# Patient Record
Sex: MALE | Race: OTHER | HISPANIC OR LATINO | Employment: OTHER | URBAN - METROPOLITAN AREA
[De-identification: names, ages, dates, MRNs, and addresses within clinical notes are randomized per-mention and may not be internally consistent; named-entity substitution may affect disease eponyms.]

---

## 2018-08-01 ENCOUNTER — HOSPITAL ENCOUNTER (INPATIENT)
Facility: HOSPITAL | Age: 83
LOS: 6 days | Discharge: HOSPICE | DRG: 698 | End: 2018-08-07
Attending: EMERGENCY MEDICINE | Admitting: INTERNAL MEDICINE
Payer: COMMERCIAL

## 2018-08-01 ENCOUNTER — APPOINTMENT (INPATIENT)
Dept: RADIOLOGY | Facility: HOSPITAL | Age: 83
DRG: 698 | End: 2018-08-01
Attending: EMERGENCY MEDICINE
Payer: COMMERCIAL

## 2018-08-01 ENCOUNTER — APPOINTMENT (EMERGENCY)
Dept: RADIOLOGY | Facility: HOSPITAL | Age: 83
DRG: 698 | End: 2018-08-01
Payer: COMMERCIAL

## 2018-08-01 ENCOUNTER — APPOINTMENT (EMERGENCY)
Dept: CT IMAGING | Facility: HOSPITAL | Age: 83
DRG: 698 | End: 2018-08-01
Payer: COMMERCIAL

## 2018-08-01 DIAGNOSIS — R65.21 SEPTIC SHOCK (HCC): ICD-10-CM

## 2018-08-01 DIAGNOSIS — N39.0 UTI (URINARY TRACT INFECTION): Primary | ICD-10-CM

## 2018-08-01 DIAGNOSIS — A41.9 SEPTIC SHOCK (HCC): ICD-10-CM

## 2018-08-01 DIAGNOSIS — N13.30 HYDROURETERONEPHROSIS: ICD-10-CM

## 2018-08-01 DIAGNOSIS — K92.2 GI BLEED: ICD-10-CM

## 2018-08-01 PROBLEM — I10 HYPERTENSION: Status: ACTIVE | Noted: 2018-08-01

## 2018-08-01 PROBLEM — D62 ACUTE BLOOD LOSS ANEMIA: Status: ACTIVE | Noted: 2018-08-01

## 2018-08-01 PROBLEM — K21.9 GERD (GASTROESOPHAGEAL REFLUX DISEASE): Status: ACTIVE | Noted: 2018-08-01

## 2018-08-01 PROBLEM — F03.90 DEMENTIA (HCC): Status: ACTIVE | Noted: 2018-08-01

## 2018-08-01 PROBLEM — N17.9 ACUTE KIDNEY INJURY (NONTRAUMATIC) (HCC): Status: ACTIVE | Noted: 2018-08-01

## 2018-08-01 LAB
ABO GROUP BLD: NORMAL
ALBUMIN SERPL BCP-MCNC: 2 G/DL (ref 3.5–5)
ALP SERPL-CCNC: 78 U/L (ref 46–116)
ALT SERPL W P-5'-P-CCNC: 20 U/L (ref 12–78)
ANION GAP SERPL CALCULATED.3IONS-SCNC: 10 MMOL/L (ref 4–13)
APTT PPP: 48 SECONDS (ref 24–36)
AST SERPL W P-5'-P-CCNC: 33 U/L (ref 5–45)
ATRIAL RATE: 87 BPM
BACTERIA UR QL AUTO: ABNORMAL /HPF
BASOPHILS # BLD MANUAL: 0 THOUSAND/UL (ref 0–0.1)
BASOPHILS NFR MAR MANUAL: 0 % (ref 0–1)
BILIRUB SERPL-MCNC: 0.4 MG/DL (ref 0.2–1)
BILIRUB UR QL STRIP: NEGATIVE
BLASTS NFR BLD MANUAL: 2 %
BLD GP AB SCN SERPL QL: NEGATIVE
BUN SERPL-MCNC: 35 MG/DL (ref 5–25)
BURR CELLS BLD QL SMEAR: PRESENT
CALCIUM SERPL-MCNC: 8 MG/DL (ref 8.3–10.1)
CHLORIDE SERPL-SCNC: 99 MMOL/L (ref 100–108)
CLARITY UR: ABNORMAL
CO2 SERPL-SCNC: 23 MMOL/L (ref 21–32)
COLOR UR: YELLOW
CREAT SERPL-MCNC: 2.08 MG/DL (ref 0.6–1.3)
EOSINOPHIL # BLD MANUAL: 0 THOUSAND/UL (ref 0–0.4)
EOSINOPHIL NFR BLD MANUAL: 0 % (ref 0–6)
ERYTHROCYTE [DISTWIDTH] IN BLOOD BY AUTOMATED COUNT: 20.5 % (ref 11.6–15.1)
GFR SERPL CREATININE-BSD FRML MDRD: 26 ML/MIN/1.73SQ M
GLUCOSE SERPL-MCNC: 149 MG/DL (ref 65–140)
GLUCOSE SERPL-MCNC: 158 MG/DL (ref 65–140)
GLUCOSE UR STRIP-MCNC: NEGATIVE MG/DL
HCT VFR BLD AUTO: 22 % (ref 36.5–49.3)
HGB BLD-MCNC: 6.5 G/DL (ref 12–17)
HGB UR QL STRIP.AUTO: ABNORMAL
HYPERCHROMIA BLD QL SMEAR: PRESENT
INR PPP: 1.51 (ref 0.86–1.17)
KETONES UR STRIP-MCNC: NEGATIVE MG/DL
LACTATE SERPL-SCNC: 1.7 MMOL/L (ref 0.5–2)
LACTATE SERPL-SCNC: 2.9 MMOL/L (ref 0.5–2)
LACTATE SERPL-SCNC: 3.5 MMOL/L (ref 0.5–2)
LACTATE SERPL-SCNC: 4.1 MMOL/L (ref 0.5–2)
LEUKOCYTE ESTERASE UR QL STRIP: ABNORMAL
LYMPHOCYTES # BLD AUTO: 1.93 THOUSAND/UL (ref 0.6–4.47)
LYMPHOCYTES # BLD AUTO: 88 % (ref 14–44)
MCH RBC QN AUTO: 27.5 PG (ref 26.8–34.3)
MCHC RBC AUTO-ENTMCNC: 29.5 G/DL (ref 31.4–37.4)
MCV RBC AUTO: 93 FL (ref 82–98)
MONOCYTES # BLD AUTO: 0.15 THOUSAND/UL (ref 0–1.22)
MONOCYTES NFR BLD: 7 % (ref 4–12)
MUCOUS THREADS UR QL AUTO: ABNORMAL
NEUTROPHILS # BLD MANUAL: 0.07 THOUSAND/UL (ref 1.85–7.62)
NEUTS SEG NFR BLD AUTO: 3 % (ref 43–75)
NITRITE UR QL STRIP: POSITIVE
NON-SQ EPI CELLS URNS QL MICRO: ABNORMAL /HPF
NRBC BLD AUTO-RTO: 1 /100 WBCS
NRBC BLD AUTO-RTO: 2 /100 WBC (ref 0–2)
OVALOCYTES BLD QL SMEAR: PRESENT
P AXIS: 85 DEGREES
PH UR STRIP.AUTO: 6 [PH] (ref 4.5–8)
PLATELET # BLD AUTO: 304 THOUSANDS/UL (ref 149–390)
PLATELET BLD QL SMEAR: ADEQUATE
PMV BLD AUTO: 12.2 FL (ref 8.9–12.7)
POIKILOCYTOSIS BLD QL SMEAR: PRESENT
POLYCHROMASIA BLD QL SMEAR: PRESENT
POTASSIUM SERPL-SCNC: 4.9 MMOL/L (ref 3.5–5.3)
PR INTERVAL: 164 MS
PROT SERPL-MCNC: 7.5 G/DL (ref 6.4–8.2)
PROT UR STRIP-MCNC: ABNORMAL MG/DL
PROTHROMBIN TIME: 17.3 SECONDS (ref 11.8–14.2)
QRS AXIS: 50 DEGREES
QRSD INTERVAL: 74 MS
QT INTERVAL: 372 MS
QTC INTERVAL: 447 MS
RBC # BLD AUTO: 2.36 MILLION/UL (ref 3.88–5.62)
RBC #/AREA URNS AUTO: ABNORMAL /HPF
RBC MORPH BLD: PRESENT
RH BLD: POSITIVE
ROULEAUX BLD QL SMEAR: PRESENT
SODIUM SERPL-SCNC: 132 MMOL/L (ref 136–145)
SP GR UR STRIP.AUTO: 1.01 (ref 1–1.03)
SPECIMEN EXPIRATION DATE: NORMAL
T WAVE AXIS: 97 DEGREES
TOTAL CELLS COUNTED SPEC: 100
TROPONIN I SERPL-MCNC: 0.18 NG/ML
TROPONIN I SERPL-MCNC: 0.2 NG/ML
UROBILINOGEN UR QL STRIP.AUTO: 0.2 E.U./DL
VENTRICULAR RATE: 87 BPM
WBC # BLD AUTO: 2.19 THOUSAND/UL (ref 4.31–10.16)
WBC #/AREA URNS AUTO: ABNORMAL /HPF

## 2018-08-01 PROCEDURE — 83605 ASSAY OF LACTIC ACID: CPT | Performed by: NURSE PRACTITIONER

## 2018-08-01 PROCEDURE — 87077 CULTURE AEROBIC IDENTIFY: CPT

## 2018-08-01 PROCEDURE — 93005 ELECTROCARDIOGRAM TRACING: CPT

## 2018-08-01 PROCEDURE — 84484 ASSAY OF TROPONIN QUANT: CPT

## 2018-08-01 PROCEDURE — 87040 BLOOD CULTURE FOR BACTERIA: CPT

## 2018-08-01 PROCEDURE — 86850 RBC ANTIBODY SCREEN: CPT | Performed by: EMERGENCY MEDICINE

## 2018-08-01 PROCEDURE — 87081 CULTURE SCREEN ONLY: CPT | Performed by: INTERNAL MEDICINE

## 2018-08-01 PROCEDURE — 71046 X-RAY EXAM CHEST 2 VIEWS: CPT

## 2018-08-01 PROCEDURE — 87086 URINE CULTURE/COLONY COUNT: CPT

## 2018-08-01 PROCEDURE — C9113 INJ PANTOPRAZOLE SODIUM, VIA: HCPCS | Performed by: INTERNAL MEDICINE

## 2018-08-01 PROCEDURE — P9016 RBC LEUKOCYTES REDUCED: HCPCS

## 2018-08-01 PROCEDURE — 83605 ASSAY OF LACTIC ACID: CPT

## 2018-08-01 PROCEDURE — 86900 BLOOD TYPING SEROLOGIC ABO: CPT | Performed by: EMERGENCY MEDICINE

## 2018-08-01 PROCEDURE — 36415 COLL VENOUS BLD VENIPUNCTURE: CPT

## 2018-08-01 PROCEDURE — 85027 COMPLETE CBC AUTOMATED: CPT

## 2018-08-01 PROCEDURE — 86901 BLOOD TYPING SEROLOGIC RH(D): CPT | Performed by: EMERGENCY MEDICINE

## 2018-08-01 PROCEDURE — 82948 REAGENT STRIP/BLOOD GLUCOSE: CPT

## 2018-08-01 PROCEDURE — 02HV33Z INSERTION OF INFUSION DEVICE INTO SUPERIOR VENA CAVA, PERCUTANEOUS APPROACH: ICD-10-PCS | Performed by: INTERNAL MEDICINE

## 2018-08-01 PROCEDURE — 36415 COLL VENOUS BLD VENIPUNCTURE: CPT | Performed by: EMERGENCY MEDICINE

## 2018-08-01 PROCEDURE — 96360 HYDRATION IV INFUSION INIT: CPT

## 2018-08-01 PROCEDURE — 99223 1ST HOSP IP/OBS HIGH 75: CPT | Performed by: INTERNAL MEDICINE

## 2018-08-01 PROCEDURE — 80053 COMPREHEN METABOLIC PANEL: CPT

## 2018-08-01 PROCEDURE — 30233N1 TRANSFUSION OF NONAUTOLOGOUS RED BLOOD CELLS INTO PERIPHERAL VEIN, PERCUTANEOUS APPROACH: ICD-10-PCS | Performed by: INTERNAL MEDICINE

## 2018-08-01 PROCEDURE — 74176 CT ABD & PELVIS W/O CONTRAST: CPT

## 2018-08-01 PROCEDURE — 86920 COMPATIBILITY TEST SPIN: CPT

## 2018-08-01 PROCEDURE — 99285 EMERGENCY DEPT VISIT HI MDM: CPT

## 2018-08-01 PROCEDURE — 87147 CULTURE TYPE IMMUNOLOGIC: CPT | Performed by: INTERNAL MEDICINE

## 2018-08-01 PROCEDURE — 81001 URINALYSIS AUTO W/SCOPE: CPT

## 2018-08-01 PROCEDURE — 85610 PROTHROMBIN TIME: CPT

## 2018-08-01 PROCEDURE — 87186 SC STD MICRODIL/AGAR DIL: CPT

## 2018-08-01 PROCEDURE — 96365 THER/PROPH/DIAG IV INF INIT: CPT

## 2018-08-01 PROCEDURE — 85730 THROMBOPLASTIN TIME PARTIAL: CPT

## 2018-08-01 PROCEDURE — 85007 BL SMEAR W/DIFF WBC COUNT: CPT

## 2018-08-01 PROCEDURE — 93010 ELECTROCARDIOGRAM REPORT: CPT | Performed by: INTERNAL MEDICINE

## 2018-08-01 PROCEDURE — 83605 ASSAY OF LACTIC ACID: CPT | Performed by: INTERNAL MEDICINE

## 2018-08-01 PROCEDURE — 71045 X-RAY EXAM CHEST 1 VIEW: CPT

## 2018-08-01 PROCEDURE — 96361 HYDRATE IV INFUSION ADD-ON: CPT

## 2018-08-01 RX ORDER — ALBUTEROL SULFATE 2.5 MG/3ML
2.5 SOLUTION RESPIRATORY (INHALATION) EVERY 6 HOURS PRN
COMMUNITY
End: 2018-08-07 | Stop reason: HOSPADM

## 2018-08-01 RX ORDER — LEVOFLOXACIN 5 MG/ML
500 INJECTION, SOLUTION INTRAVENOUS ONCE
Status: COMPLETED | OUTPATIENT
Start: 2018-08-01 | End: 2018-08-01

## 2018-08-01 RX ORDER — DIPHENOXYLATE HYDROCHLORIDE AND ATROPINE SULFATE 2.5; .025 MG/1; MG/1
1 TABLET ORAL DAILY
COMMUNITY
End: 2018-08-07 | Stop reason: HOSPADM

## 2018-08-01 RX ORDER — SODIUM CHLORIDE 9 MG/ML
125 INJECTION, SOLUTION INTRAVENOUS CONTINUOUS
Status: DISCONTINUED | OUTPATIENT
Start: 2018-08-01 | End: 2018-08-02

## 2018-08-01 RX ORDER — PANTOPRAZOLE SODIUM 40 MG/1
40 TABLET, DELAYED RELEASE ORAL DAILY
COMMUNITY

## 2018-08-01 RX ORDER — FUROSEMIDE 10 MG/ML
20 INJECTION INTRAMUSCULAR; INTRAVENOUS ONCE
Status: COMPLETED | OUTPATIENT
Start: 2018-08-01 | End: 2018-08-01

## 2018-08-01 RX ORDER — PHYTONADIONE 10 MG/ML
5 INJECTION, EMULSION INTRAMUSCULAR; INTRAVENOUS; SUBCUTANEOUS ONCE
Status: COMPLETED | OUTPATIENT
Start: 2018-08-01 | End: 2018-08-01

## 2018-08-01 RX ORDER — CHLORHEXIDINE GLUCONATE 0.12 MG/ML
15 RINSE ORAL EVERY 12 HOURS SCHEDULED
Status: DISCONTINUED | OUTPATIENT
Start: 2018-08-01 | End: 2018-08-04

## 2018-08-01 RX ORDER — ACETAMINOPHEN 325 MG/1
650 TABLET ORAL EVERY 6 HOURS PRN
COMMUNITY
End: 2018-08-07 | Stop reason: HOSPADM

## 2018-08-01 RX ORDER — ACETAMINOPHEN 650 MG/1
650 SUPPOSITORY RECTAL ONCE
Status: COMPLETED | OUTPATIENT
Start: 2018-08-01 | End: 2018-08-01

## 2018-08-01 RX ORDER — DOCUSATE SODIUM 100 MG/1
100 CAPSULE, LIQUID FILLED ORAL 2 TIMES DAILY
COMMUNITY

## 2018-08-01 RX ORDER — MULTIVIT WITH MINERALS/LUTEIN
1000 TABLET ORAL DAILY
COMMUNITY
End: 2018-08-07 | Stop reason: HOSPADM

## 2018-08-01 RX ORDER — SODIUM CHLORIDE 9 MG/ML
1000 INJECTION, SOLUTION INTRAVENOUS CONTINUOUS
Status: DISCONTINUED | OUTPATIENT
Start: 2018-08-01 | End: 2018-08-01

## 2018-08-01 RX ORDER — VANCOMYCIN HYDROCHLORIDE 1 G/200ML
1000 INJECTION, SOLUTION INTRAVENOUS EVERY 24 HOURS
Status: DISCONTINUED | OUTPATIENT
Start: 2018-08-01 | End: 2018-08-02

## 2018-08-01 RX ORDER — SENNA PLUS 8.6 MG/1
1 TABLET ORAL DAILY
COMMUNITY

## 2018-08-01 RX ORDER — CIPROFLOXACIN 250 MG/1
500 TABLET, FILM COATED ORAL EVERY 12 HOURS SCHEDULED
COMMUNITY
Start: 2018-07-31 | End: 2018-08-07 | Stop reason: HOSPADM

## 2018-08-01 RX ADMIN — FUROSEMIDE 20 MG: 10 INJECTION, SOLUTION INTRAVENOUS at 20:23

## 2018-08-01 RX ADMIN — SODIUM CHLORIDE 1000 ML: 0.9 INJECTION, SOLUTION INTRAVENOUS at 12:50

## 2018-08-01 RX ADMIN — CHLORHEXIDINE GLUCONATE 0.12% ORAL RINSE 15 ML: 1.2 LIQUID ORAL at 20:24

## 2018-08-01 RX ADMIN — CEFEPIME HYDROCHLORIDE 2000 MG: 2 INJECTION, SOLUTION INTRAVENOUS at 13:53

## 2018-08-01 RX ADMIN — VANCOMYCIN HYDROCHLORIDE 1000 MG: 1 INJECTION, SOLUTION INTRAVENOUS at 16:18

## 2018-08-01 RX ADMIN — SODIUM CHLORIDE 1000 ML/HR: 0.9 INJECTION, SOLUTION INTRAVENOUS at 11:37

## 2018-08-01 RX ADMIN — LEVOFLOXACIN 500 MG: 5 INJECTION, SOLUTION INTRAVENOUS at 12:33

## 2018-08-01 RX ADMIN — ACETAMINOPHEN 650 MG: 650 SUPPOSITORY RECTAL at 14:20

## 2018-08-01 RX ADMIN — SODIUM CHLORIDE 1000 ML: 0.9 INJECTION, SOLUTION INTRAVENOUS at 11:37

## 2018-08-01 RX ADMIN — SODIUM CHLORIDE 8 MG/HR: 9 INJECTION, SOLUTION INTRAVENOUS at 15:28

## 2018-08-01 RX ADMIN — PHYTONADIONE 5 MG: 10 INJECTION, EMULSION INTRAMUSCULAR; INTRAVENOUS; SUBCUTANEOUS at 16:26

## 2018-08-01 RX ADMIN — SODIUM CHLORIDE 125 ML/HR: 0.9 INJECTION, SOLUTION INTRAVENOUS at 15:24

## 2018-08-01 NOTE — SEPSIS NOTE
Sepsis Note   Rhoda Croft 80 y o  male MRN: [id number]  Unit/Bed#: -02 Encounter: [id number]            Initial Sepsis Screening     Row Name 08/01/18 1320                Is the patient's history suggestive of a new or worsening infection? (!)  Yes (Proceed)  -MIREILLE        Suspected source of infection urinary tract infection  -MIREILLE        Are two or more of the following signs & symptoms of infection both present and new to the patient? (!)  Yes (Proceed)  -MIREILLE        Indicate SIRS criteria Hyperthemia > 38 3C (100 9F); Leukopenia (WBC < 4000 IJL)  -MIREILLE        If the answer is yes to both questions, suspicion of sepsis is present          If severe sepsis is present AND tissue hypoperfusion perists in the hour after fluid resuscitation or lactate > 4, the patient meets criteria for SEPTIC SHOCK          Are any of the following organ dysfunction criteria present within 6 hours of suspected infection and SIRS criteria that are NOT considered to be chronic conditions? (!)  Yes  -MIREILLE        Organ dysfunction Lactate > 2 0 mmol/L  -MIREILLE        Date of presentation of severe sepsis 08/01/18  -MIREILLE        Time of presentation of severe sepsis 1321  -MIREILLE        Tissue hypoperfusion persists in the hour after crystalloid fluid administration, evidenced, by either: Mean arterial pressure < 65 mm/Hg ( ___ mm Hg in comment field)  -Cristobal Ann        Was hypotension present within one hour of the conclusion of crystalloid fluid administration?  Yes  -MIREILLE        Date of presentation of septic shock 08/01/18  -MIREILLE        Time of presentation of septic shock [address]  (r) = Recorded By, (t) = Taken By, (c) = Cosigned By    [address] Name Provider Type    [address], DO Physician               Default Flowsheet Data (last 720 hours)      Sepsis Reassess     Row Name 08/01/18 1735 08/01/18 1438                Repeat Volume Status and Tissue Perfusion Assessment Performed    Repeat Volume Status and Tissue Perfusion Assessment Performed Yes  -PW Yes  -MIREILLE          Volume Status and Tissue Perfusion Post Fluid Resuscitation- Must Document 5 of the Following 7:    Vital Signs Reviewed (HR, RR, BP, T) Yes  -PW Yes  -MIREILLE       Arterial Oxygen Saturation Reviewed (POx, SaO2 or SpO2) Yes (comment %)  -PW Yes (comment %)   98 on 3 liters  -MIREILLE       Cardio Normal S1/S2  -PW Normal S1/S2; Regular rate and rhythm  -MIREILLE       Pulmonary Normal effort  -PW (!)  Tachypnea;Clear to auscultation  -MIREILLE       Capillary Refill Brisk  -PW Brisk  -MIREILLE       Peripheral Pulses Radial  -PW Radial  -MIREILLE       Peripheral Pulse +2  -PW +2  -MIREILLE       Skin Warm;Dry  -PW Warm  -MIREILLE       Urine output assessed   Decreased  -MIREILLE          *OR*   Intensive Monitoring- Must Document One of the Following Four *:    Vital Signs Reviewed           * Central Venous Pressure (CVP or RAP)           * Central Venous Oxygen (SVO2, ScvO2 or Oxygen saturation via central catheter)           * Bedside Cardiovascular US in IVC diameter and % collapse           * Passive Leg Raise OR Crystalloid Challenge             User Key  (r) = Recorded By, (t) = Taken By, (c) = Cosigned By    Initials Name Provider Type    [address], DO Physician    Olegario Hernandez MD Physician

## 2018-08-01 NOTE — ED PROVIDER NOTES
History  Chief Complaint   Patient presents with    Fever - 75 years or older     To ED via EMS for evaluation of fever x 2 days per staff at Facility  Patient noted to have increased confusion  Patient brought in by ambulance for fever 2 days now, started on oral cipro and has chronic indwelling rondon  Given tylenol but unsure of timeframe  Poor historian  Prior to Admission Medications   Prescriptions Last Dose Informant Patient Reported? Taking? Ascorbic Acid (VITAMIN C) 1000 MG tablet  Self Yes Yes   Sig: Take 1,000 mg by mouth daily   acetaminophen (TYLENOL) 325 mg tablet  Self Yes Yes   Sig: Take 650 mg by mouth every 6 (six) hours as needed for mild pain   albuterol (2 5 mg/3 mL) 0 083 % nebulizer solution  Self Yes Yes   Sig: Take 2 5 mg by nebulization every 6 (six) hours as needed for wheezing or shortness of breath   ciprofloxacin (CIPRO) 250 mg tablet  Self Yes Yes   Sig: Take 500 mg by mouth every 12 (twelve) hours   docusate sodium (COLACE) 100 mg capsule  Self Yes Yes   Sig: Take 100 mg by mouth 2 (two) times a day   metoprolol tartrate (LOPRESSOR) 25 mg tablet  Self Yes Yes   Sig: Take 25 mg by mouth every 12 (twelve) hours   multivitamin (THERAGRAN) TABS  Self Yes Yes   Sig: Take 1 tablet by mouth daily   pantoprazole (PROTONIX) 40 mg tablet  Self Yes Yes   Sig: Take 40 mg by mouth daily   senna (SENOKOT) 8 6 MG tablet  Self Yes Yes   Sig: Take 1 tablet by mouth daily      Facility-Administered Medications: None       Past Medical History:   Diagnosis Date    Anemia     Anxiety     Dementia     Diverticulitis     GERD (gastroesophageal reflux disease)     GI bleed     Hyperlipidemia     Hypertension     Renal disorder     UTI (urinary tract infection)        Past Surgical History:   Procedure Laterality Date    ABDOMINAL SURGERY         History reviewed  No pertinent family history  I have reviewed and agree with the history as documented      Social History Substance Use Topics    Smoking status: Former Smoker    Smokeless tobacco: Never Used    Alcohol use No        Review of Systems   Unable to perform ROS: Dementia       Physical Exam  Physical Exam   Constitutional: He appears lethargic  He appears cachectic  He appears ill  HENT:   Mouth/Throat: Oropharynx is clear and moist  Mucous membranes are dry  Eyes: Conjunctivae and EOM are normal  Pupils are equal, round, and reactive to light  Neck: Normal range of motion  Neck supple  No spinous process tenderness present  Cardiovascular: Normal rate, regular rhythm, normal heart sounds and intact distal pulses  Pulmonary/Chest: Effort normal and breath sounds normal  No respiratory distress  He has no wheezes  Abdominal: Soft  He exhibits distension  Bowel sounds are increased  There is no tenderness  Genitourinary: Testes normal and penis normal  Rectal exam shows guaiac positive stool  Uncircumcised  Genitourinary Comments: Massey in place with sediment in tube   Musculoskeletal: Normal range of motion  Neurological: He has normal strength  He appears lethargic  No sensory deficit  GCS eye subscore is 3  GCS verbal subscore is 4  GCS motor subscore is 6  Skin: Skin is warm and dry  No rash noted  There is pallor  Psychiatric: He has a normal mood and affect  Nursing note and vitals reviewed        Vital Signs  ED Triage Vitals [08/01/18 1129]   Temperature Pulse Respirations Blood Pressure SpO2   (!) 101 2 °F (38 4 °C) 82 18 (!) 84/45 90 %      Temp Source Heart Rate Source Patient Position - Orthostatic VS BP Location FiO2 (%)   Rectal Monitor Lying Left arm --      Pain Score       No Pain           Vitals:    08/01/18 1345 08/01/18 1400 08/01/18 1415 08/01/18 1430   BP: 110/55 111/55 95/52 93/62   Pulse: 98  101 74   Patient Position - Orthostatic VS: Lying  Lying        Visual Acuity  Visual Acuity      Most Recent Value   L Pupil Size (mm)  3   R Pupil Size (mm)  3          ED Medications  Medications   sodium chloride 0 9 % infusion (0 mL/hr Intravenous Stopped 8/1/18 1437)   norepinephrine (LEVOPHED) 4 mg (STANDARD CONCENTRATION) IV in sodium chloride 0 9% 250 mL (0 mcg/min Intravenous Hold 8/1/18 1345)   sodium chloride 0 9 % bolus 1,000 mL (0 mL Intravenous Stopped 8/1/18 1250)   sodium chloride 0 9 % bolus 1,000 mL (0 mL Intravenous Stopped 8/1/18 1417)   levofloxacin (LEVAQUIN) IVPB (premix) 500 mg (0 mg Intravenous Stopped 8/1/18 1350)   cefepime (MAXIPIME) 2 g/50 mL dextrose IVPB (0 mg Intravenous Stopped 8/1/18 1437)   acetaminophen (TYLENOL) rectal suppository 650 mg (650 mg Rectal Given 8/1/18 1420)       Diagnostic Studies  Results Reviewed     Procedure Component Value Units Date/Time    Troponin I [[id number]]  (Abnormal) Collected:  08/01/18 1419    Lab Status:  Final result Specimen:  Blood from Arm, Left Updated:  08/01/18 1446     Troponin I 0 18 (H) ng/mL     Lactic Acid x2 [[id number]]  (Abnormal) Collected:  08/01/18 1332    Lab Status:  Final result Specimen:  Blood from Arm, Right Updated:  08/01/18 1409     LACTIC ACID 4 1 (HH) mmol/L     Narrative:         Result may be elevated if tourniquet was used during collection  Urine Microscopic [[id number]]  (Abnormal) Collected:  08/01/18 1245    Lab Status:  Final result Specimen:  Urine from Urine, Indwelling Massey Catheter Updated:  08/01/18 1329     RBC, UA 2-4 (A) /hpf      WBC, UA 20-30 (A) /hpf      Epithelial Cells Occasional /hpf      Bacteria, UA Innumerable (A) /hpf      MUCOUS THREADS Occasional (A)    Urine culture [[id number]] Collected:  08/01/18 1245    Lab Status:   In process Specimen:  Urine from Urine, Indwelling Massey Catheter Updated:  08/01/18 1329    UA w Reflex to Microscopic w Reflex to Culture [[id number]]  (Abnormal) Collected:  08/01/18 1245    Lab Status:  Final result Specimen:  Urine from Urine, Indwelling Massey Catheter Updated:  08/01/18 1320     Color, UA Yellow     Clarity, UA Slightly Cloudy Specific Gravity, UA 1 015     pH, UA 6 0     Leukocytes, UA Large (A)     Nitrite, UA Positive (A)     Protein,  (2+) (A) mg/dl      Glucose, UA Negative mg/dl      Ketones, UA Negative mg/dl      Urobilinogen, UA 0 2 E U /dl      Bilirubin, UA Negative     Blood, UA Trace-Intact (A)    Hemoglobin and hematocrit, blood [[id number]]     Lab Status:  No result Specimen:  Blood     CBC and differential [[id number]]  (Abnormal) Collected:  08/01/18 1133    Lab Status:  Final result Specimen:  Blood from Arm, Right Updated:  08/01/18 1228     WBC 2 19 (L) Thousand/uL      RBC 2 36 (L) Million/uL      Hemoglobin 6 5 (LL) g/dL      Hematocrit 22 0 (L) %      MCV 93 fL      MCH 27 5 pg      MCHC 29 5 (L) g/dL      RDW 20 5 (H) %      MPV 12 2 fL      Platelets 491 Thousands/uL      nRBC 1 /100 WBCs     Narrative:        NO CLOTS    Lactic Acid x2 [[id number]]  (Abnormal) Collected:  08/01/18 1133    Lab Status:  Final result Specimen:  Blood from Arm, Right Updated:  08/01/18 1217     LACTIC ACID 3 5 (HH) mmol/L     Narrative:         Result may be elevated if tourniquet was used during collection      Troponin I [[id number]]  (Abnormal) Collected:  08/01/18 1133    Lab Status:  Final result Specimen:  Blood from Arm, Right Updated:  08/01/18 1213     Troponin I 0 20 (H) ng/mL     Comprehensive metabolic panel [[id number]]  (Abnormal) Collected:  08/01/18 1133    Lab Status:  Final result Specimen:  Blood from Arm, Right Updated:  08/01/18 1210     Sodium 132 (L) mmol/L      Potassium 4 9 mmol/L      Chloride 99 (L) mmol/L      CO2 23 mmol/L      Anion Gap 10 mmol/L      BUN 35 (H) mg/dL      Creatinine 2 08 (H) mg/dL      Glucose 149 (H) mg/dL      Calcium 8 0 (L) mg/dL      AST 33 U/L      ALT 20 U/L      Alkaline Phosphatase 78 U/L      Total Protein 7 5 g/dL      Albumin 2 0 (L) g/dL      Total Bilirubin 0 40 mg/dL      eGFR 26 ml/min/1 73sq m     Narrative:         National Kidney Disease Education Program recommendations are as follows:  GFR calculation is accurate only with a steady state creatinine  Chronic Kidney disease less than 60 ml/min/1 73 sq  meters  Kidney failure less than 15 ml/min/1 73 sq  meters  APTT [[id number]]  (Abnormal) Collected:  08/01/18 1133    Lab Status:  Final result Specimen:  Blood from Arm, Right Updated:  08/01/18 1206     PTT 48 (H) seconds     Protime-INR [[id number]]  (Abnormal) Collected:  08/01/18 1133    Lab Status:  Final result Specimen:  Blood from Arm, Right Updated:  08/01/18 1206     Protime 17 3 (H) seconds      INR 1 51 (H)    Blood culture #2 [[id number]] Collected:  08/01/18 1133    Lab Status: In process Specimen:  Blood from Arm, Left Updated:  08/01/18 1151    Blood culture #1 [[id number]] Collected:  08/01/18 1133    Lab Status: In process Specimen:  Blood from Arm, Right Updated:  08/01/18 1151                 CT abdomen pelvis wo contrast   Final Result by Kalie Benz MD (08/01 1343)      No acute intra-abdominal abnormality is identified  Moderate bilateral hydroureteronephrosis  Splenomegaly  Workstation performed: [id number]         X-ray chest 2 views   Final Result by Lui Herrera DO (08/01 1158)      Mild interstitial prominence suggests interstitial edema              Workstation performed: [id number]         XR chest 1 view portable    (Results Pending)          Central line at cavoatrial junction, no pneumothorax    Procedures  ECG 12 Lead Documentation  Date/Time: 8/1/2018 11:55 AM  Performed by: Darlene Duff  Authorized by: Darlene Duff     Indications / Diagnosis:  Sepsis  ECG reviewed by me, the ED Provider: yes    Patient location:  ED  Previous ECG:     Previous ECG:  Unavailable  Interpretation:     Interpretation: normal    Rate:     ECG rate:  87    ECG rate assessment: normal    Rhythm:     Rhythm: sinus rhythm    Ectopy:     Ectopy: PAC    QRS:     QRS axis:  Normal    QRS intervals:  Normal  Conduction: Conduction: normal    ST segments:     ST segments:  Normal  T waves:     T waves: normal    CriticalCare Time  Performed by: Tori Buckley by: Nenita Chamberlain     Critical care provider statement:     Critical care time (minutes):  30    Critical care time was exclusive of:  Separately billable procedures and treating other patients and teaching time    Critical care was necessary to treat or prevent imminent or life-threatening deterioration of the following conditions:  Circulatory failure, sepsis and shock    Critical care was time spent personally by me on the following activities:  Obtaining history from patient or surrogate, development of treatment plan with patient or surrogate, discussions with consultants, evaluation of patient's response to treatment, examination of patient, review of old charts, re-evaluation of patient's condition, ordering and review of radiographic studies, ordering and review of laboratory studies and ordering and performing treatments and interventions  Central Line  Date/Time: 8/1/2018 2:19 PM  Performed by: Tori Buckley by: Nenita Chamberlain     Patient location:  ED  Consent:     Consent obtained:  Written    Consent given by:  Healthcare agent    Risks discussed:  Bleeding, infection and pneumothorax    Alternatives discussed:  No treatment  Universal protocol:     Procedure explained and questions answered to patient or proxy's satisfaction: yes      Relevant documents present and verified: yes      Immediately prior to procedure, a time out was called: yes      Patient identity confirmed:  Verbally with patient  Pre-procedure details:     Hand hygiene: Hand hygiene performed prior to insertion      Sterile barrier technique:  All elements of maximal sterile technique followed      Skin preparation:  2% chlorhexidine    Skin preparation agent: Skin preparation agent completely dried prior to procedure    Indications:     Central line indications: medications requiring central line, hemodynamic monitoring and other (comment)      Central line indications comment:  Vasopressors  Anesthesia (see MAR for exact dosages): Anesthesia method:  Local infiltration    Local anesthetic:  Lidocaine 1% w/o epi  Procedure details:     Location:  Left subclavian    Vessel type: vein      Laterality:  Left    Approach: percutaneous technique used      Patient position:  Trendelenburg    Catheter type:  Triple lumen 20cm    Catheter size:  7 Fr    Landmarks identified: yes      Ultrasound guidance: no      Number of attempts:  2    Successful placement: yes      Vessel of catheter tip end:  19 cm  Post-procedure details:     Post-procedure:  Dressing applied and line sutured    Assessment:  Blood return through all ports, no pneumothorax on x-ray, free fluid flow and placement verified by x-ray    Patient tolerance of procedure: Tolerated well, no immediate complications           Phone Contacts  ED Phone Contact    ED Course  ED Course as of Aug 01 1455   Wed Aug 01, 2018   1204 Critical Hb 6 5    1223 Left message for son to call back    1316 Monocytes %: 7     spoke with Rupa Paula - he will be here in several hours  He agrees with central line but states that his father is DNR/ DNI and does not want CPR  Initial Sepsis Screening     Row Name 08/01/18 1320                Is the patient's history suggestive of a new or worsening infection? (!)  Yes (Proceed)  -MIREILLE        Suspected source of infection urinary tract infection  -MIREILLE        Are two or more of the following signs & symptoms of infection both present and new to the patient? (!)  Yes (Proceed)  -MIREILLE        Indicate SIRS criteria Hyperthemia > 38 3C (100 9F); Leukopenia (WBC < 4000 IJL)  -MIREILLE        If the answer is yes to both questions, suspicion of sepsis is present          If severe sepsis is present AND tissue hypoperfusion perists in the hour after fluid resuscitation or lactate > 4, the patient meets criteria for SEPTIC SHOCK          Are any of the following organ dysfunction criteria present within 6 hours of suspected infection and SIRS criteria that are NOT considered to be chronic conditions? (!)  Yes  -MIREILLE        Organ dysfunction Lactate > 2 0 mmol/L  -MIREILLE        Date of presentation of severe sepsis 08/01/18  -MIREILLE        Time of presentation of severe sepsis 1321  -MIREILLE        Tissue hypoperfusion persists in the hour after crystalloid fluid administration, evidenced, by either: Mean arterial pressure < 65 mm/Hg ( ___ mm Hg in comment field)  -Laz Johnston        Was hypotension present within one hour of the conclusion of crystalloid fluid administration? Yes  -MIREILLE        Date of presentation of septic shock 08/01/18  -MIREILLE        Time of presentation of septic shock [address]  (r) = Recorded By, (t) = Taken By, (c) = Cosigned By    [address] Name Provider Type    [address], DO Physician           Default Flowsheet Data (last 720 hours)      Sepsis Reassess     Row Name 08/01/18 1438                   Repeat Volume Status and Tissue Perfusion Assessment Performed    Repeat Volume Status and Tissue Perfusion Assessment Performed Yes  -MIREILLE           Volume Status and Tissue Perfusion Post Fluid Resuscitation- Must Document 5 of the Following 7:    Vital Signs Reviewed (HR, RR, BP, T) Yes  -MIREILLE        Arterial Oxygen Saturation Reviewed (POx, SaO2 or SpO2) Yes (comment %)   98 on 3 liters  -MIREILLE        Cardio Normal S1/S2; Regular rate and rhythm  -MIREILLE        Pulmonary (!)  Tachypnea;Clear to auscultation  -MIREILLE        Capillary Refill Brisk  -MIREILLE        Peripheral Pulses Radial  -MIREILLE        Peripheral Pulse +2  -MIREILLE        Skin Warm  -MIREILLE        Urine output assessed Decreased  -MIREILLE           *OR*   Intensive Monitoring- Must Document One of the Following Four *:    Vital Signs Reviewed          * Central Venous Pressure (CVP or RAP)          * Central Venous Oxygen (SVO2, ScvO2 or Oxygen saturation via central catheter)          * Bedside Cardiovascular US in IVC diameter and % collapse          * Passive Leg Raise OR Crystalloid Challenge            User Key  (r) = Recorded By, (t) = Taken By, (c) = Cosigned By    Initials Name Provider Type    Cami Cuevas DO Physician                MDM  Number of Diagnoses or Management Options  GI bleed: new and requires workup  Septic shock [facility]): new and requires workup  UTI (urinary tract infection): new and requires workup     Amount and/or Complexity of Data Reviewed  Clinical lab tests: ordered and reviewed  Tests in the radiology section of CPT®: ordered and reviewed  Obtain history from someone other than the patient: yes  Discuss the patient with other providers: yes    Patient Progress  Patient progress: improved    The patient presented with a condition in which there was a high probability of imminent or life-threatening deterioration, and critical care services (excluding separately billable procedures) totalled 30-74 minutes          Disposition  Final diagnoses:   UTI (urinary tract infection)   GI bleed   Septic shock ([facility] [address] )   Hydroureteronephrosis - bilateral     Time reflects when diagnosis was documented in both MDM as applicable and the Disposition within this note     Time User Action Codes Description Comment    8/1/2018  1:20 PM Malen Hare Add [N39 0] UTI (urinary tract infection)     8/1/2018  1:20 PM Malen Hare Add [K92 2] GI bleed     8/1/2018  1:33 PM Malen Hare Add [A41 9,  R65 21] Septic shock ([address] )     8/1/2018  2:43 PM Nima Guanako  Modify [N39 0] UTI (urinary tract infection)     8/1/2018  2:43 PM Nima Guanako  Modify [K92 2] GI bleed     8/1/2018  2:51 PM Malen Hare Add [N13 30] Hydroureteronephrosis     8/1/2018  2:51 PM Malen Hare Modify [N13 30] Hydroureteronephrosis bilateral      ED Disposition     ED Disposition Condition Comment    Admit  Case was discussed with Satinder Sánchez* and the patient's admission status was agreed to be Admission Status: inpatient status to the service of Dr Governor Hendricks   Follow-up Information    None         Patient's Medications   Discharge Prescriptions    No medications on file     No discharge procedures on file      ED Provider  Electronically Signed by           Jing Bedolla DO  08/01/18 7369

## 2018-08-01 NOTE — ED NOTES
Patient appears to be increasingly more confused  Now speaking mostly in [place] license of [facility] whereas previously speaking in [place]   Not easily redirected     Danielle Fuchs RN  08/01/18 0396

## 2018-08-01 NOTE — ED NOTES
Floor notified of delay due to CVC placement  ICU RN to call back when ICU room available  This RN to transport at that time       Bob Mabry RN  08/01/18 3934

## 2018-08-01 NOTE — H&P
History and Physical - Theresa Farris 80 y o  male MRN: [id number]  Unit/Bed#: ED 04 A Encounter: [id number]    Assessment/Plan     Assessment:  Principal Problem:    GI bleed  Active Problems:    UTI (urinary tract infection)    Hypertension    Dementia    Acute blood loss anemia    GERD (gastroesophageal reflux disease)   Acute kidney injury    Plan:  Admit ICU require grade 2 midnight stay  Patient receiving blood for acute GI bleed  GI consultation will be done  Continue IV Protonix  Keep NPO    Measure CVP continue fluid resuscitation  The patient is clearly volume depleted  with elevated BUN and creatinine above his baseline  His lactic acidosis is not necessarily from sepsis but rather tissue hypoperfusion which is exacerbated by his anemia  Regarding his sepsis will be treated empirically with cefepime and vancomycin for catheter associated urinary tract infection  Patient has mild prolongation of his INR probably nutritional basis  Will give a dose of vitamin K  repeat INR tomorrow    The emergency room physician spoke with his son and he is a level 3 DNR  History of Present Illness   HPI: Theresa Farris is a 80y o  year old male who presents with GI bleeding  No history from the patient patient was admitted [facility] approximately a month ago where Massey catheter was inserted for urinary retention  Patient has a history of dementia is resident of nursing home  According nursing home records the patient spiked temperature 102° today  He had decreased appetite over the last few days  Thus he was sent to the emergency room for evaluation  Upon presentation he was found to be hypotensive quite anemic  He has heme-positive stools  No history is obtained from the patient  Review of Systems   Reason unable to perform ROS: Dementia         Historical Information   Past Medical History:   Diagnosis Date    Anemia     Anxiety     Dementia     Diverticulitis     GERD (gastroesophageal reflux disease)     GI bleed     Hyperlipidemia     Hypertension     Renal disorder     UTI (urinary tract infection)      Past Surgical History:   Procedure Laterality Date    ABDOMINAL SURGERY       Social History   History   Alcohol Use No     History   Drug Use No     History   Smoking Status    Former Smoker   Smokeless Tobacco    Never Used     Family History: History reviewed  No pertinent family history  Meds/Allergies      Current Facility-Administered Medications   Medication Dose Route Frequency    norepinephrine (LEVOPHED) 4 mg (STANDARD CONCENTRATION) IV in sodium chloride 0 9% 250 mL  1-30 mcg/min Intravenous Titrated    sodium chloride 0 9 % infusion  1,000 mL/hr Intravenous Continuous         (Not in a hospital admission)  No Known Allergies    Objective   Vitals: Blood pressure 95/52, pulse 101, temperature (!) 100 8 °F (38 2 °C), resp  rate (!) 24, weight 58 9 kg (129 lb 13 6 oz), SpO2 98 %  No intake or output data in the 24 hours ending 08/01/18 1435  Invasive Devices     Central Venous Catheter Line            CVC Central Lines 08/01/18 Triple Left Subclavian less than 1 day          Peripheral Intravenous Line            Peripheral IV 08/01/18 Left Antecubital less than 1 day    Peripheral IV 08/01/18 Right Antecubital less than 1 day          Drain            Urethral Catheter Temperature probe 16 Fr  less than 1 day                Physical Exam   Constitutional: He appears well-developed  Chronically ill-appearing   HENT:   Head: Normocephalic and atraumatic  Mouth/Throat: No oropharyngeal exudate  Eyes: Pupils are equal, round, and reactive to light  Neck: No JVD present  No thyromegaly present  Cardiovascular: Normal rate and regular rhythm  Pulmonary/Chest: Effort normal and breath sounds normal    Abdominal: Soft  Bowel sounds are normal  There is no tenderness  Musculoskeletal: He exhibits no edema  Neurological: He is alert   A cranial nerve deficit is present  Coordination normal    Intermittently talking between Macedonian and English   Skin: Skin is warm and dry  Pale with decreased turgor   Vitals reviewed        Lab Results:   Admission on 08/01/2018   Component Date Value    PTT 08/01/2018 48*    Protime 08/01/2018 17 3*    INR 08/01/2018 1 51*    WBC 08/01/2018 2 19*    RBC 08/01/2018 2 36*    Hemoglobin 08/01/2018 6 5*    Hematocrit 08/01/2018 22 0*    MCV 08/01/2018 93     MCH 08/01/2018 27 5     MCHC 08/01/2018 29 5*    RDW 08/01/2018 20 5*    MPV 08/01/2018 12 2     Platelets 65/46/5544 304     nRBC 08/01/2018 1     Sodium 08/01/2018 132*    Potassium 08/01/2018 4 9     Chloride 08/01/2018 99*    CO2 08/01/2018 23     Anion Gap 08/01/2018 10     BUN 08/01/2018 35*    Creatinine 08/01/2018 2 08*    Glucose 08/01/2018 149*    Calcium 08/01/2018 8 0*    AST 08/01/2018 33     ALT 08/01/2018 20     Alkaline Phosphatase 08/01/2018 78     Total Protein 08/01/2018 7 5     Albumin 08/01/2018 2 0*    Total Bilirubin 08/01/2018 0 40     eGFR 08/01/2018 26     LACTIC ACID 08/01/2018 4 1*    LACTIC ACID 08/01/2018 3 5*    Color, UA 08/01/2018 Yellow     Clarity, UA 08/01/2018 Slightly Cloudy     Specific Gravity, UA 08/01/2018 1 015     pH, UA 08/01/2018 6 0     Leukocytes, UA 08/01/2018 Large*    Nitrite, UA 08/01/2018 Positive*    Protein, UA 08/01/2018 100 (2+)*    Glucose, UA 08/01/2018 Negative     Ketones, UA 08/01/2018 Negative     Urobilinogen, UA 08/01/2018 0 2     Bilirubin, UA 08/01/2018 Negative     Blood, UA 08/01/2018 Trace-Intact*    Troponin I 08/01/2018 0 20*    Segmented % 08/01/2018 3*    Lymphocytes % 08/01/2018 88*    Monocytes % 08/01/2018 7     Eosinophils % 08/01/2018 0     Basophils % 08/01/2018 0     Blasts % 08/01/2018 2*    Absolute Neutrophils 08/01/2018 0 07*    Lymphocytes Absolute 08/01/2018 1 93     Monocytes Absolute 08/01/2018 0 15     Eosinophils Absolute 08/01/2018 0 00     Basophils Absolute 08/01/2018 0 00     Total Counted 08/01/2018 100     nRBC 08/01/2018 2     RBC Morphology 08/01/2018 Present     Joceline Cells 08/01/2018 Present     Hypochromia 08/01/2018 Present     Ovalocytes 08/01/2018 Present     Poikilocytes 08/01/2018 Present     Polychromasia 08/01/2018 Present     Rouleaux 08/01/2018 Present     Platelet Estimate 73/56/7812 Adequate     ABO Grouping 08/01/2018 O     Rh Factor 08/01/2018 Positive     Antibody Screen 08/01/2018 Negative     Specimen Expiration Date 08/01/2018 [id number]     Unit Product Code 08/01/2018 [id number]     Unit Number 08/01/2018 [id number]     Unit ABO 08/01/2018 O     Unit RH 08/01/2018 POS     Crossmatch 08/01/2018 Compatible     Unit Dispense Status 08/01/2018 Crossmatched     Unit Product Code 08/01/2018 [id number]     Unit Number 08/01/2018 [id number]     Unit ABO 08/01/2018 O     Unit RH 08/01/2018 POS     Crossmatch 08/01/2018 Compatible     Unit Dispense Status 08/01/2018 Crossmatched     RBC, UA 08/01/2018 2-4*    WBC, UA 08/01/2018 20-30*    Epithelial Cells 08/01/2018 Occasional     Bacteria, UA 08/01/2018 Innumerable*    MUCOUS THREADS 08/01/2018 Occasional*     Imaging Studies: I have personally reviewed pertinent reports  EKG, Pathology, and Other Studies: I have personally reviewed pertinent reports  VTE  Prophylaxis: Algorithmic determination of appropriate prophylaxis determined  This note has been constructed using a voice recognition system         Kathryn Ivey MD

## 2018-08-01 NOTE — ED NOTES
Patient's bottom cleansed as patient had soft moderate brown BM     Katrinmariah Thomas RN  08/01/18 8719

## 2018-08-01 NOTE — ED NOTES
Physician notified of hypotension  Physician verbal order for fluids  No further orders at this time        Alfreda Hernandez, KIMMIE  08/01/18 6055

## 2018-08-02 PROBLEM — R65.20 SEVERE SEPSIS (HCC): Status: ACTIVE | Noted: 2018-08-02

## 2018-08-02 PROBLEM — N40.1 BENIGN PROSTATIC HYPERPLASIA WITH URINARY RETENTION: Status: ACTIVE | Noted: 2018-08-02

## 2018-08-02 PROBLEM — A41.9 SEVERE SEPSIS (HCC): Status: ACTIVE | Noted: 2018-08-02

## 2018-08-02 PROBLEM — R33.8 BENIGN PROSTATIC HYPERPLASIA WITH URINARY RETENTION: Status: ACTIVE | Noted: 2018-08-02

## 2018-08-02 LAB
ABO GROUP BLD BPU: NORMAL
ABO GROUP BLD BPU: NORMAL
ANION GAP SERPL CALCULATED.3IONS-SCNC: 10 MMOL/L (ref 4–13)
ANISOCYTOSIS BLD QL SMEAR: PRESENT
BASOPHILS # BLD MANUAL: 0 THOUSAND/UL (ref 0–0.1)
BASOPHILS NFR MAR MANUAL: 0 % (ref 0–1)
BPU ID: NORMAL
BPU ID: NORMAL
BUN SERPL-MCNC: 33 MG/DL (ref 5–25)
CALCIUM SERPL-MCNC: 7.3 MG/DL (ref 8.3–10.1)
CHLORIDE SERPL-SCNC: 104 MMOL/L (ref 100–108)
CO2 SERPL-SCNC: 21 MMOL/L (ref 21–32)
CREAT SERPL-MCNC: 1.62 MG/DL (ref 0.6–1.3)
CROSSMATCH: NORMAL
CROSSMATCH: NORMAL
EOSINOPHIL # BLD MANUAL: 0 THOUSAND/UL (ref 0–0.4)
EOSINOPHIL NFR BLD MANUAL: 0 % (ref 0–6)
ERYTHROCYTE [DISTWIDTH] IN BLOOD BY AUTOMATED COUNT: 18.4 % (ref 11.6–15.1)
GFR SERPL CREATININE-BSD FRML MDRD: 36 ML/MIN/1.73SQ M
GLUCOSE SERPL-MCNC: 128 MG/DL (ref 65–140)
HCT VFR BLD AUTO: 24.1 % (ref 36.5–49.3)
HCT VFR BLD AUTO: 24.5 % (ref 36.5–49.3)
HCT VFR BLD AUTO: 24.7 % (ref 36.5–49.3)
HCT VFR BLD AUTO: 24.7 % (ref 36.5–49.3)
HCT VFR BLD AUTO: 25.4 % (ref 36.5–49.3)
HGB BLD-MCNC: 7.5 G/DL (ref 12–17)
HGB BLD-MCNC: 7.6 G/DL (ref 12–17)
HGB BLD-MCNC: 7.6 G/DL (ref 12–17)
HGB BLD-MCNC: 7.7 G/DL (ref 12–17)
HGB BLD-MCNC: 7.7 G/DL (ref 12–17)
HYPERCHROMIA BLD QL SMEAR: PRESENT
INR PPP: 1.57 (ref 0.86–1.17)
LYMPHOCYTES # BLD AUTO: 2.53 THOUSAND/UL (ref 0.6–4.47)
LYMPHOCYTES # BLD AUTO: 82 % (ref 14–44)
MACROCYTES BLD QL AUTO: PRESENT
MCH RBC QN AUTO: 28.8 PG (ref 26.8–34.3)
MCHC RBC AUTO-ENTMCNC: 31.1 G/DL (ref 31.4–37.4)
MCV RBC AUTO: 93 FL (ref 82–98)
MONOCYTES # BLD AUTO: 0.09 THOUSAND/UL (ref 0–1.22)
MONOCYTES NFR BLD: 3 % (ref 4–12)
NEUTROPHILS # BLD MANUAL: 0.12 THOUSAND/UL (ref 1.85–7.62)
NEUTS SEG NFR BLD AUTO: 4 % (ref 43–75)
NRBC BLD AUTO-RTO: 1 /100 WBC (ref 0–2)
NRBC BLD AUTO-RTO: 1 /100 WBCS
OVALOCYTES BLD QL SMEAR: PRESENT
PLATELET # BLD AUTO: 251 THOUSANDS/UL (ref 149–390)
PLATELET BLD QL SMEAR: ADEQUATE
PMV BLD AUTO: 12.4 FL (ref 8.9–12.7)
POIKILOCYTOSIS BLD QL SMEAR: PRESENT
POLYCHROMASIA BLD QL SMEAR: PRESENT
POTASSIUM SERPL-SCNC: 4.7 MMOL/L (ref 3.5–5.3)
PROTHROMBIN TIME: 17.9 SECONDS (ref 11.8–14.2)
RBC # BLD AUTO: 2.6 MILLION/UL (ref 3.88–5.62)
RBC MORPH BLD: PRESENT
SODIUM SERPL-SCNC: 135 MMOL/L (ref 136–145)
TOTAL CELLS COUNTED SPEC: 100
UNIT DISPENSE STATUS: NORMAL
UNIT DISPENSE STATUS: NORMAL
UNIT PRODUCT CODE: NORMAL
UNIT PRODUCT CODE: NORMAL
UNIT RH: NORMAL
UNIT RH: NORMAL
VARIANT LYMPHS # BLD AUTO: 11 %
WBC # BLD AUTO: 3.09 THOUSAND/UL (ref 4.31–10.16)

## 2018-08-02 PROCEDURE — 80048 BASIC METABOLIC PNL TOTAL CA: CPT | Performed by: INTERNAL MEDICINE

## 2018-08-02 PROCEDURE — 85018 HEMOGLOBIN: CPT | Performed by: INTERNAL MEDICINE

## 2018-08-02 PROCEDURE — 85007 BL SMEAR W/DIFF WBC COUNT: CPT | Performed by: INTERNAL MEDICINE

## 2018-08-02 PROCEDURE — 85014 HEMATOCRIT: CPT | Performed by: INTERNAL MEDICINE

## 2018-08-02 PROCEDURE — 99233 SBSQ HOSP IP/OBS HIGH 50: CPT | Performed by: INTERNAL MEDICINE

## 2018-08-02 PROCEDURE — 85027 COMPLETE CBC AUTOMATED: CPT | Performed by: INTERNAL MEDICINE

## 2018-08-02 PROCEDURE — C9113 INJ PANTOPRAZOLE SODIUM, VIA: HCPCS | Performed by: INTERNAL MEDICINE

## 2018-08-02 PROCEDURE — 85610 PROTHROMBIN TIME: CPT | Performed by: INTERNAL MEDICINE

## 2018-08-02 RX ORDER — FUROSEMIDE 10 MG/ML
20 INJECTION INTRAMUSCULAR; INTRAVENOUS ONCE
Status: COMPLETED | OUTPATIENT
Start: 2018-08-02 | End: 2018-08-02

## 2018-08-02 RX ADMIN — SODIUM CHLORIDE 125 ML/HR: 0.9 INJECTION, SOLUTION INTRAVENOUS at 05:24

## 2018-08-02 RX ADMIN — SODIUM CHLORIDE 8 MG/HR: 9 INJECTION, SOLUTION INTRAVENOUS at 23:12

## 2018-08-02 RX ADMIN — CEFEPIME HYDROCHLORIDE 1000 MG: 1 INJECTION, SOLUTION INTRAVENOUS at 12:51

## 2018-08-02 RX ADMIN — CHLORHEXIDINE GLUCONATE 0.12% ORAL RINSE 15 ML: 1.2 LIQUID ORAL at 21:55

## 2018-08-02 RX ADMIN — FUROSEMIDE 20 MG: 10 INJECTION, SOLUTION INTRAVENOUS at 12:50

## 2018-08-02 RX ADMIN — SODIUM CHLORIDE 8 MG/HR: 9 INJECTION, SOLUTION INTRAVENOUS at 00:06

## 2018-08-02 RX ADMIN — CEFEPIME HYDROCHLORIDE 1000 MG: 1 INJECTION, SOLUTION INTRAVENOUS at 02:19

## 2018-08-02 RX ADMIN — CHLORHEXIDINE GLUCONATE 0.12% ORAL RINSE 15 ML: 1.2 LIQUID ORAL at 09:17

## 2018-08-02 RX ADMIN — SODIUM CHLORIDE 8 MG/HR: 9 INJECTION, SOLUTION INTRAVENOUS at 12:11

## 2018-08-02 NOTE — CONSULTS
Tami Vasquez  [id number]    81 y o   male      ASSESSMENT  1  Anemia, recurrent with a history of heme-positive stool  No overt GI blood loss noted  Hemoglobin in the 6 g range and archie to 7 5 g following transfusion with 2 units of packed red blood cells  INR also slightly elevated and he was given vitamin K  Presented to [facility] in March with the same and underwent an upper endoscopy that did not show any active upper GI bleeding site  A colonoscopy was also performed at that time that showed polyps that were not removed and  a stercoral ulcer  Bleeding scan was also performed without any obvious GI bleeding source  PLAN  1  Follow H&H and transfuse to maintain hemoglobin greater than or equal to 7 5 g  2   Spoke to patient's son Anita Miranda and he prefers that no further endoscopic procedures be performed unless overt GI blood loss noted or hemoglobin falls drastically  3  PPI  4  Bowel regimen    Chief Complaint   Patient presents with    Fever - 75 years or older     To ED via EMS for evaluation of fever x 2 days per staff at [place] [address]  Patient noted to have increased confusion  HPI   59-year-old male the acute on chronic anemia, chronic kidney disease, and  dementia who is primarily [place] speaking  History obtained from the patient's son as well as the chart  He presents from the nursing home for fevers  Found to have a urinary tract infection  No overt GI bleeding to include rectal bleeding, maroon stool or melena  No vomiting  No abdominal pain  This some believes he has lost some weight but the amount is unclear  No mention of NSAID, anticoagulation or anti-platelet medication on med sheet      Past Medical History:   Diagnosis Date    Anemia     Anxiety     Dementia     Diverticulitis     GERD (gastroesophageal reflux disease)     GI bleed     Hyperlipidemia     Hypertension     Renal disorder     UTI (urinary tract infection)        Past Surgical History:   Procedure Laterality Date    ABDOMINAL SURGERY         Prescriptions Prior to Admission   Medication    acetaminophen (TYLENOL) 325 mg tablet    albuterol (2 5 mg/3 mL) 0 083 % nebulizer solution    Ascorbic Acid (VITAMIN C) 1000 MG tablet    ciprofloxacin (CIPRO) 250 mg tablet    docusate sodium (COLACE) 100 mg capsule    metoprolol tartrate (LOPRESSOR) 25 mg tablet    multivitamin (THERAGRAN) TABS    pantoprazole (PROTONIX) 40 mg tablet    senna (SENOKOT) 8 6 MG tablet       No Known Allergies    Social History   Substance Use Topics    Smoking status: Former Smoker    Smokeless tobacco: Never Used    Alcohol use No       History reviewed  No pertinent family history      Review of Systems  General ROS: negative for weight loss, + fever, night sweats  Psychological ROS: negative for depression or anxiety  Ophthalmic ROS: negative for any eye issues  ENT ROS: no scleral icterus  Hematological and Lymphatic: no issues with bleeding or adenopathy  Respiratory ROS: no chest pain, shortness of breath or cough  Cardiovascular ROS: no issues with chest pain, heart palpitations  Gastrointestinal ROS: no issues with nausea, vomiting, diarrhea or abdominal pain  Genito-Urinary ROS: no issues with urinary burning, urinary frequency or hematuria  Neurological ROS:  no asterixis  Dermatological ROS: no skin rashes or lesions        /53   Pulse 93   Temp 100 4 °F (38 °C)   Resp 18   Wt 59 3 kg (130 lb 11 7 oz)   SpO2 97%       Physical Exam     Constitutional:   no acute distress, non-toxic appearance   Eyes:  conjunctiva normal   HENT:  Atraumatic  Respiratory:  No respiratory distress  Cardiovascular:  Normal rate   GI:  Soft, nondistended, normal bowel sounds, nontender  Musculoskeletal:  No edema  Neurologic:  Awake and oriented x1              Lab Results   Component Value Date    GLUCOSE 128 08/02/2018    INR 1 57 (H) 08/02/2018    CALCIUM 7 3 (L) 08/02/2018     (L) 08/02/2018    K 4 7 08/02/2018    CO2 21 08/02/2018     08/02/2018    BUN 33 (H) 08/02/2018    CREATININE 1 62 (H) 08/02/2018     Lab Results   Component Value Date    WBC 3 09 (L) 08/02/2018    HGB 7 5 (L) 08/02/2018    HCT 24 1 (L) 08/02/2018    MCV 93 08/02/2018     08/02/2018     Lab Results   Component Value Date    ALT 20 08/01/2018    AST 33 08/01/2018    ALKPHOS 78 08/01/2018    BILITOT 0 40 08/01/2018     No results found for: AMYLASE  No results found for: LIPASE  No results found for: IRON, TIBC, FERRITIN  Lab Results   Component Value Date    INR 1 57 (H) 08/02/2018           Ct Abdomen Pelvis Wo Contrast    Result Date: 8/1/2018  Narrative: CT ABDOMEN AND PELVIS WITHOUT IV CONTRAST INDICATION:   GI bleeding  COMPARISON:  None  TECHNIQUE:  CT examination of the abdomen and pelvis was performed without intravenous contrast   Axial, sagittal, and coronal 2D reformatted images were created from the source data and submitted for interpretation  Radiation dose length product (DLP) for this visit:  337 53 mGy-cm   This examination, like all CT scans performed in the [facility], was performed utilizing techniques to minimize radiation dose exposure, including the use of iterative  reconstruction and automated exposure control  Enteric contrast was administered  FINDINGS: ABDOMEN LOWER CHEST:  Atelectatic changes are noted at the lung bases  LIVER/BILIARY TREE:  Unremarkable  GALLBLADDER:  No calcified gallstones  No pericholecystic inflammatory change  SPLEEN:  Splenomegaly is noted measuring 16 4 cm in craniocaudal dimensions  PANCREAS:  Unremarkable  ADRENAL GLANDS:  Unremarkable  KIDNEYS/URETERS:  Mild bilateral hydroureteronephrosis is present  Right renal cyst is present  STOMACH AND BOWEL:  There is colonic diverticulosis without evidence of acute diverticulitis  APPENDIX:  No findings to suggest appendicitis  ABDOMINOPELVIC CAVITY:  No ascites or free intraperitoneal air  No lymphadenopathy   VESSELS: Unremarkable for patient's age  PELVIS REPRODUCTIVE ORGANS:  Unremarkable for patient's age  URINARY BLADDER:  Massey catheter is present  ABDOMINAL WALL/INGUINAL REGIONS:  Unremarkable  OSSEOUS STRUCTURES:  No acute fracture or destructive osseous lesion  Chronic appearing superior endplate compression deformity is noted at L1  Impression: No acute intra-abdominal abnormality is identified  Moderate bilateral hydroureteronephrosis  Splenomegaly  Workstation performed: [id number]     Xr Chest 1 View Portable    Result Date: 8/1/2018  Narrative: CHEST INDICATION:   Central line placement  COMPARISON:  Earlier today  EXAM PERFORMED/VIEWS:  XR CHEST PORTABLE FINDINGS:  Left subclavian central line has been placed terminating in the SVC  Cardiomediastinal silhouette appears stable  Interstitial prominence with bibasilar atelectasis unchanged  Osseous structures appear within normal limits for patient age  Impression: Left subclavian central line terminating in the SVC without pneumothorax  Persistent vascular congestion and bibasilar atelectasis  Workstation performed: [id number]     X-ray Chest 2 Views    Result Date: 8/1/2018  Narrative: CHEST INDICATION:   Shortness of breath  COMPARISON:  None EXAM PERFORMED/VIEWS:  XR CHEST PA & LATERAL FINDINGS: Cardiac silhouette normal in size  Mild interstitial prominence suggesting interstitial edema  No consolidation or pneumothorax  Osseous structures appear within normal limits for patient age  Impression: Mild interstitial prominence suggests interstitial edema  Workstation performed: [id number]     Counseling / Coordination of Care  Total floor / unit time spent today 25 minutes  Greater than 50% of total time was spent with the patient and / or family counseling and / or coordination of care  A description of the counseling / coordination of care: Zee Herzog

## 2018-08-02 NOTE — SOCIAL WORK
Acknowledge Pt is confused per chart  Call made to [facility] in admissions at [facility]  Per [facility] Seal is long termaster 15 day bed hold  Pt is non ambulatory and was receiving PT and speech at facility for transfers, bed mobility and leg exercises  Await PT/OT trinaal  Will need to obtain insurance auth for SNF approval prior to discharge  Call made to Pt's son(Primitivo: [phone]), left message  Anticipate return call  Will follow

## 2018-08-02 NOTE — ASSESSMENT & PLAN NOTE
Will continue IV Protonix drip, GI wrote for clear liquid diet and will evaluate for endoscopy tomorrow

## 2018-08-02 NOTE — CASE MANAGEMENT
Initial Clinical Review  Admission: Date/Time/Statement: 8/1/18 @ 1336   Orders Placed This Encounter   Procedures    Inpatient Admission (expected length of stay for this patient is greater than two midnights)     Standing Status:   Standing     Number of Occurrences:   1     Order Specific Question:   Admitting Physician     Answer:   Logan Caputo [[id number]]     Order Specific Question:   Level of Care     Answer:   Critical Care [15]     Order Specific Question:   Estimated length of stay     Answer:   More than 2 Midnights     Order Specific Question:   Certification     Answer:   I certify that inpatient services are medically necessary for this patient for a duration of greater than two midnights  See H&P and MD Progress Notes for additional information about the patient's course of treatment  ED: Date/Time/Mode of Arrival:   ED Arrival Information     Expected Arrival Acuity Means of Arrival Escorted By Service Admission Type    - 8/1/2018 11:22 Emergent Ambulance Other General Medicine Emergency    Arrival Complaint    fever      Chief Complaint:   Chief Complaint   Patient presents with    Fever - 75 years or older     To ED via EMS for evaluation of fever x 2 days per staff at Facility  Patient noted to have increased confusion  History of Illness:   Patient brought in by ambulance for fever 2 days now, started on oral cipro and has chronic indwelling rondon  Given tylenol but unsure of timeframe  Poor historian  guaiac positive stool    ED Vital Signs:   ED Triage Vitals [08/01/18 1129]   Temperature Pulse Respirations Blood Pressure SpO2   (!) 101 2 °F (38 4 °C) 82 18 (!) 84/45 90 %      Temp Source Heart Rate Source Patient Position - Orthostatic VS BP Location FiO2 (%)   Rectal Monitor Lying Left arm --      Pain Score       No Pain        Wt Readings from Last 1 Encounters:   08/02/18 59 3 kg (130 lb 11 7 oz)   Vital Signs (abnormal):   BP 93/62  Abnormal Labs/Diagnostic Test Results:   PT 48 PT 17 3 INR 1 51 WBC 2 19 HGB 6 5  CL 99 BUN 35 CR 2 08 GLUC 149 CA 8 0 ALB 2 0 GFR 26 LACTIC ACID 3 5   TROP 0 20, 0 18  U/A+ LEUK, NITRITE, PROT, BLOOD, BACTERIA  CXR=Mild interstitial prominence suggests interstitial edema  CT A/P=No acute intra-abdominal abnormality is identified  Moderate bilateral hydroureteronephrosis  Splenomegaly    EKG=  Ventricular Rate BPM 87    Atrial Rate BPM 87    PA Interval ms 164    QRSD Interval ms 74    QT Interval ms 372    QTC Interval ms 447    P Axis degrees 85    QRS Axis degrees 50    T Wave Axis degrees 97      Poor data quality, interpretation may be adversely affected  Sinus rhythm with Premature atrial complexes      ED Treatment:   Medication Administration from 08/01/2018 1122 to 08/01/2018 1507       Date/Time Order Dose Route Action Action by Comments     08/01/2018 1250 sodium chloride 0 9 % bolus 1,000 mL 0 mL Intravenous Stopped Sho Vance RN      08/01/2018 1137 sodium chloride 0 9 % bolus 1,000 mL 1,000 mL Intravenous New Bag Sho Vance RN      08/01/2018 1437 sodium chloride 0 9 % infusion 0 mL/hr Intravenous Stopped Sho Vance RN      08/01/2018 1137 sodium chloride 0 9 % infusion 1,000 mL/hr Intravenous New Bag Sho Vance RN      08/01/2018 1417 sodium chloride 0 9 % bolus 1,000 mL 0 mL Intravenous Stopped Sho Vance RN      08/01/2018 1250 sodium chloride 0 9 % bolus 1,000 mL 1,000 mL Intravenous New Bag Sho Vance RN      08/01/2018 1350 levofloxacin (LEVAQUIN) IVPB (premix) 500 mg 0 mg Intravenous Stopped Sho Vance RN      08/01/2018 1233 levofloxacin (LEVAQUIN) IVPB (premix) 500 mg 500 mg Intravenous New Bag Sho Vance RN      08/01/2018 1345 norepinephrine (LEVOPHED) 4 mg (STANDARD CONCENTRATION) IV in sodium chloride 0 9% 250 mL 0 mcg/min Intravenous Hold Sho Vance RN      08/01/2018 1437 cefepime (MAXIPIME) 2 g/50 mL dextrose IVPB 0 mg Intravenous Stopped Sho Vance RN      08/01/2018 1353 cefepime (MAXIPIME) 2 g/50 mL dextrose IVPB 2,000 mg Intravenous New Bag Gauri Vazquez RN      08/01/2018 1420 acetaminophen (TYLENOL) rectal suppository 650 mg 650 mg Rectal Given Gauri Vazquez RN fever      Past Medical/Surgical History: Active Ambulatory Problems     Diagnosis Date Noted    No Active Ambulatory Problems     Resolved Ambulatory Problems     Diagnosis Date Noted    No Resolved Ambulatory Problems     Past Medical History:   Diagnosis Date    Anemia     Anxiety     Dementia     Diverticulitis     GERD (gastroesophageal reflux disease)     GI bleed     Hyperlipidemia     Hypertension     Renal disorder     UTI (urinary tract infection)    Admitting Diagnosis: Hydroureteronephrosis [N13 30]  UTI (urinary tract infection) [N39 0]  GI bleed [K92 2]  Fever [R50 9]  Septic shock (HCC) [A41 9, R65 21]  Age/Sex: 80 y o  male  Assessment/Plan:   GI bleed  Active Problems:    UTI (urinary tract infection)    Hypertension    Dementia    Acute blood loss anemia    GERD (gastroesophageal reflux disease)   Acute kidney injury  Plan:  Admit ICU require grade 2 midnight stay  Patient receiving blood for acute GI bleed  GI consultation will be done  Continue IV Protonix  Keep NPO  Measure CVP continue fluid resuscitation  The patient is clearly volume depleted  with elevated BUN and creatinine above his baseline  His lactic acidosis is not necessarily from sepsis but rather tissue hypoperfusion which is exacerbated by his anemia  Regarding his sepsis will be treated empirically with cefepime and vancomycin for catheter associated urinary tract infection  Patient has mild prolongation of his INR probably nutritional basis    Will give a dose of vitamin K  repeat INR tomorrow  Admission Orders:  ICU  NPO  CONSULT GI  VENODYNES  TRANSFUSE 2UPRBC'S  CVC LINE CARE  Scheduled Meds:   Current Facility-Administered Medications:  cefepime 1,000 mg Intravenous Q12H Jitendra Dunne MD Last Rate: 1,000 mg (08/02/18 0219)   chlorhexidine 15 mL Swish & Spit Q12H [address] Aarti Acuña MD    pantoprozole (PROTONIX) infusion (Continuous) 8 mg/hr Intravenous Continuous Aarti Acuña MD Last Rate: 8 mg/hr (08/02/18 0006)   phenol 1 spray Mouth/Throat Q2H PRN [employer], CRNP    sodium chloride 125 mL/hr Intravenous Continuous Aarti Acuña MD Last Rate: 10 mL/hr (08/02/18 0720)   vancomycin 1,000 mg Intravenous Q24H Aarti Acuña MD Last Rate: 1,000 mg (08/01/18 1618)   Continuous Infusions:   pantoprozole (PROTONIX) infusion (Continuous) 8 mg/hr Last Rate: 8 mg/hr (08/02/18 0006)   sodium chloride 125 mL/hr Last Rate: 10 mL/hr (08/02/18 0720)   PRN Meds: phenol  Thank you,  Jonny Oliverq  [facility] Utilization Review Department  Phone: [phone]; Fax [phone]  ATTENTION: The Network Utilization Review Department is now centralized for our 9 Facilities  Make a note that we have a new phone and fax numbers for our Department  Please call with any questions or concerns to [phone] and carefully follow the prompts so that you are directed to the right person  All voicemails are confidential  Fax any determinations, approvals, denials, and requests for initial or continue stay review clinical to [phone]  Due to HIGH CALL volume, it would be easier if you could please send faxed requests to expedite your requests and in part, help us provide discharge notifications faster

## 2018-08-02 NOTE — ASSESSMENT & PLAN NOTE
Patient's creatinine decreased to 1 62 after hydration and blood transfusion      Patient appears to be volume overloaded will give him IV Lasix today will monitor renal function with serial blood work

## 2018-08-02 NOTE — ASSESSMENT & PLAN NOTE
Patient met criteria for severe sepsis on admission as evidenced by temperature 101 2°, heart of 98, respiratory of 30, leukocyte count 2 19 and lactic acid level 3 5    This is most likely due to UTI

## 2018-08-02 NOTE — ASSESSMENT & PLAN NOTE
The patient's urine culture suggests Pseudomonas will await final speciation  Will continue IV cefepime in the meantime  Patient's UTI is associated with chronic indwelling Massey!

## 2018-08-02 NOTE — PROGRESS NOTES
Progress Note - Kailey Carreon 6/11/1924, 80 y o  male MRN: [id number]    Unit/Bed#: -02 Encounter: [id number]    Primary Care Provider: Karma Patel MD   Date and time admitted to hospital: 8/1/2018 11:25 AM        * GI bleed   Assessment & Plan    Will continue IV Protonix drip, GI wrote for clear liquid diet and will evaluate for endoscopy tomorrow        Severe sepsis [facility])   Assessment & Plan    Patient met criteria for severe sepsis on admission as evidenced by temperature 101 2°, heart of 98, respiratory of 30, leukocyte count 2 19 and lactic acid level 3 5  This is most likely due to UTI        Acute blood loss anemia   Assessment & Plan    Hemoglobin is 7 6 after administration path blood cells  UTI (urinary tract infection)   Assessment & Plan    The patient's urine culture suggests Pseudomonas will await final speciation  Will continue IV cefepime in the meantime  Patient's UTI is associated with chronic indwelling Massey! Acute kidney injury (nontraumatic) ([facility] [address] )   Assessment & Plan    Patient's creatinine decreased to 1 62 after hydration and blood transfusion  Patient appears to be volume overloaded will give him IV Lasix today will monitor renal function with serial blood work        Benign prostatic hyperplasia with urinary retention   Assessment & Plan    Patient came in with Massey catheter will continue the same  This was change in the ER            VTE Prophylaxis: in place    Patient Centered Rounds: I rounded with patient's nurse    Current Length of Stay: 1 day(s)    Current Patient Status: Inpatient    Certification Statement: Pt requires additional inpatient hospital stay due to: see assessment and plan        Subjective:   Patient's nurse reports that patient is much more awake alert today than he was yesterday  He had no vomiting, was not complaining of abdominal pain  Patient has Massey catheter in her he came in with change in the ER      Patient's CVP is increasing: It is 15 currently, the IV fluid was stopped    All other ROS are negative    Objective:     Vitals:   Temp (24hrs), Av 3 °F (37 9 °C), Min:98 °F (36 7 °C), Max:101 5 °F (38 6 °C)    HR:  [] 93  Resp:  [17-38] 18  BP: ()/(49-62) 113/53  SpO2:  [85 %-99 %] 97 %  There is no height or weight on file to calculate BMI  Input and Output Summary (last 24 hours): Intake/Output Summary (Last 24 hours) at 18 1201  Last data filed at 18 0401   Gross per 24 hour   Intake          1466 67 ml   Output             1400 ml   Net            66 67 ml       Physical Exam:     Physical Exam   Constitutional: He appears well-developed  No distress  HENT:   Head: Normocephalic  Mouth/Throat: Oropharynx is clear and moist    Eyes: Conjunctivae are normal    Neck: Neck supple  Cardiovascular: Normal rate  Irregular, monitor shows frequent PACs  There is no pedal edema   Pulmonary/Chest: Effort normal  No respiratory distress  He has wheezes (inspiratory crackles are present at the base)  He has rales  Abdominal: Soft  Bowel sounds are normal  He exhibits no distension  There is no tenderness  Musculoskeletal: He exhibits no tenderness  Lymphadenopathy:     He has no cervical adenopathy  Neurological: He is alert  No cranial nerve deficit  Patient follows commands,  strength is 5 5 bilaterally equal   Skin: Skin is warm and dry  No rash noted  Psychiatric: He has a normal mood and affect  Vitals reviewed  I personally reviewed labs and imaging reports for today        Last 24 Hours Medication List:     Current Facility-Administered Medications:  cefepime 1,000 mg Intravenous Q12H Wayne Valle MD Last Rate: 1,000 mg (18 0219)   chlorhexidine 15 mL Swish & Spit Q12H [address] Wayne Valle MD    furosemide 20 mg Intravenous Once Florlatoya Mendiola MD    pantoprozole (PROTONIX) infusion (Continuous) 8 mg/hr Intravenous Continuous Wayne Valle MD Last Rate: 8 mg/hr (08/02/18 0006)   phenol 1 spray Mouth/Throat Q2H PRN ALPESH Pollock           Today, Patient Was Seen By: Glenis Elliott MD    ** Please Note: Dictation voice to text software may have been used in the creation of this document   **

## 2018-08-03 LAB
ANION GAP SERPL CALCULATED.3IONS-SCNC: 9 MMOL/L (ref 4–13)
BACTERIA UR CULT: ABNORMAL
BUN SERPL-MCNC: 29 MG/DL (ref 5–25)
CALCIUM SERPL-MCNC: 7.8 MG/DL (ref 8.3–10.1)
CHLORIDE SERPL-SCNC: 107 MMOL/L (ref 100–108)
CO2 SERPL-SCNC: 22 MMOL/L (ref 21–32)
CREAT SERPL-MCNC: 1.43 MG/DL (ref 0.6–1.3)
GFR SERPL CREATININE-BSD FRML MDRD: 42 ML/MIN/1.73SQ M
GLUCOSE SERPL-MCNC: 112 MG/DL (ref 65–140)
HCT VFR BLD AUTO: 24.7 % (ref 36.5–49.3)
HCT VFR BLD AUTO: 24.8 % (ref 36.5–49.3)
HGB BLD-MCNC: 7.6 G/DL (ref 12–17)
HGB BLD-MCNC: 7.8 G/DL (ref 12–17)
MRSA NOSE QL CULT: ABNORMAL
MRSA NOSE QL CULT: ABNORMAL
POTASSIUM SERPL-SCNC: 4.4 MMOL/L (ref 3.5–5.3)
SODIUM SERPL-SCNC: 138 MMOL/L (ref 136–145)

## 2018-08-03 PROCEDURE — 97167 OT EVAL HIGH COMPLEX 60 MIN: CPT

## 2018-08-03 PROCEDURE — 99233 SBSQ HOSP IP/OBS HIGH 50: CPT | Performed by: INTERNAL MEDICINE

## 2018-08-03 PROCEDURE — G8982 BODY POS GOAL STATUS: HCPCS

## 2018-08-03 PROCEDURE — 97163 PT EVAL HIGH COMPLEX 45 MIN: CPT

## 2018-08-03 PROCEDURE — G8981 BODY POS CURRENT STATUS: HCPCS

## 2018-08-03 PROCEDURE — G8988 SELF CARE GOAL STATUS: HCPCS

## 2018-08-03 PROCEDURE — 85014 HEMATOCRIT: CPT | Performed by: INTERNAL MEDICINE

## 2018-08-03 PROCEDURE — G8987 SELF CARE CURRENT STATUS: HCPCS

## 2018-08-03 PROCEDURE — 85018 HEMOGLOBIN: CPT | Performed by: INTERNAL MEDICINE

## 2018-08-03 PROCEDURE — 97530 THERAPEUTIC ACTIVITIES: CPT

## 2018-08-03 PROCEDURE — 80048 BASIC METABOLIC PNL TOTAL CA: CPT | Performed by: INTERNAL MEDICINE

## 2018-08-03 RX ORDER — PANTOPRAZOLE SODIUM 40 MG/1
40 TABLET, DELAYED RELEASE ORAL
Status: DISCONTINUED | OUTPATIENT
Start: 2018-08-03 | End: 2018-08-07 | Stop reason: HOSPADM

## 2018-08-03 RX ADMIN — CEFEPIME HYDROCHLORIDE 1000 MG: 1 INJECTION, SOLUTION INTRAVENOUS at 02:17

## 2018-08-03 RX ADMIN — METOPROLOL TARTRATE 25 MG: 25 TABLET ORAL at 22:03

## 2018-08-03 RX ADMIN — CEFEPIME HYDROCHLORIDE 1000 MG: 1 INJECTION, SOLUTION INTRAVENOUS at 13:37

## 2018-08-03 RX ADMIN — CHLORHEXIDINE GLUCONATE 0.12% ORAL RINSE 15 ML: 1.2 LIQUID ORAL at 08:17

## 2018-08-03 RX ADMIN — METOPROLOL TARTRATE 25 MG: 25 TABLET ORAL at 10:10

## 2018-08-03 RX ADMIN — PANTOPRAZOLE SODIUM 40 MG: 40 TABLET, DELAYED RELEASE ORAL at 15:43

## 2018-08-03 RX ADMIN — CHLORHEXIDINE GLUCONATE 0.12% ORAL RINSE 15 ML: 1.2 LIQUID ORAL at 22:03

## 2018-08-03 NOTE — PHYSICAL THERAPY NOTE
Physical Therapy Evaluation     Patient's Name: Km Rodríguez    Admitting Diagnosis  Hydroureteronephrosis [N13 30]  UTI (urinary tract infection) [N39 0]  GI bleed [K92 2]  Fever [R50 9]  Septic shock ([address] ) [A41 9, R65 21]    Problem List  Patient Active Problem List   Diagnosis    GI bleed    UTI (urinary tract infection)    Hypertension    Dementia    Acute blood loss anemia    GERD (gastroesophageal reflux disease)    Acute kidney injury (nontraumatic) (HCC)    Severe sepsis (HCC)    Benign prostatic hyperplasia with urinary retention       Past Medical History  Past Medical History:   Diagnosis Date    Anemia     Anxiety     Dementia     Diverticulitis     GERD (gastroesophageal reflux disease)     GI bleed     Hyperlipidemia     Hypertension     Renal disorder     UTI (urinary tract infection)        Past Surgical History  Past Surgical History:   Procedure Laterality Date    ABDOMINAL SURGERY          08/03/18 1325   Note Type   Note type Eval/Treat  (bed level)   Pain Assessment   Pain Assessment FLACC   Pain Type Chronic pain   Pain Location Back   Pain Descriptors Patient unable to describe   Pain Frequency Intermittent   Pain Onset Ongoing   Clinical Progression Not changed   Effect of Pain on Daily Activities general guarding   Patient's Stated Pain Goal No pain   Hospital Pain Intervention(s) Repositioned;Distraction; Emotional support   Response to Interventions appears to be comfortable at the end of session   Pain Rating: FLACC (Rest) - Face 0   Pain Rating: FLACC (Rest) - Legs 0   Pain Rating: FLACC (Rest) - Activity 0   Pain Rating: FLACC (Rest) - Cry 0   Pain Rating: FLACC (Rest) - Consolability 0   Score: FLACC (Rest) 0   Pain Rating: FLACC (Activity) - Face 1   Pain Rating: FLACC (Activity) - Legs 0   Pain Rating: FLACC (Activity) - Activity 0   Pain Rating: FLACC (Activity) - Cry 1   Pain Rating: FLACC (Activity) - Consolability 0   Score: FLACC (Activity) 2   Home Living   Type of Home SNF  (resident of [facility] w/ 15 day MA bed hold)   [facility]   Additional Comments pt was non-ambulatory prior to admission (per CM note)   Prior Function   Level of [place] Needs assistance with ADLs and functional mobility  (reportedly (A)x1 w/ mobility (transfers); pedals his w/c)   Lives With Facility staff   Receives Help From Personal care attendant  (pt was receiving PT at [facility])   Restrictions/Precautions   Other Precautions Fall Risk;O2;Telemetry;Multiple lines;Cognitive;Contact/isolation; Bed Alarm   General   Additional Pertinent History Cleared for assessment (spoke to nsg)   Cognition   Overall Cognitive Status Impaired   Arousal/Participation Alert   Attention Difficulty attending to directions   Orientation Level Oriented to person  (primarily Swedish speaking; hx of dementia)   Memory Decreased recall of biographical information;Decreased long term memory;Decreased short term memory;Decreased recall of recent events   Following Commands Follows one step commands inconsistently   Comments Pt is observed in bed; flat affect; occasionally responds to questions in English; pleasant and cooperative; agreeable to try mobilization; exhibits intermittent bouts of coughing (nsg aware; r/o speech consult)   RUE Assessment   RUE Assessment X  (decreased shld AROM)   LUE Assessment   LUE Assessment X  (decreased shld AROM)   RLE Assessment   RLE Assessment (decreased AROM t/o)   Strength RLE   RLE Overall Strength (poor - hip and knee; poor ankle (grossly))   Tone RLE   RLE Tone Hypertonic   LLE Assessment   LLE Assessment (decreased AROM t/o; decreased knee flexion PROM)   Strength LLE   LLE Overall Strength (trace + hip and knee flexion; poor ankle (grossly))   Tone LLE   LLE Tone Hypertonic  (extension)   Bed Mobility   Supine to Sit 2  Maximal assistance   Additional items Assist x 2;HOB elevated; Increased time required;Verbal cues;LE management   Sit to Supine 2  Maximal assistance   Additional items Assist x 2;Verbal cues;LE management; Increased time required   Transfers   Sit to Stand Unable to assess  (not appropriate at this time)   Balance   Static Sitting Poor -  (retropulsive)   Activity Tolerance   Activity Tolerance Patient limited by fatigue;Treatment limited secondary to medical complications (Comment); Other (Comment)  (cog status)   Nurse Made Aware spoke to NYASIA SALAZAR RN   Assessment   Prognosis Guarded   Problem List Decreased strength;Decreased range of motion;Decreased endurance; Impaired balance;Decreased mobility; Decreased coordination;Decreased cognition; Impaired tone   Assessment Bed level assessment/mobilization initiated  Pt is 80 y o  male admitted with hx of GI bleed and currently undergoing w/u  Pt 's comorbidities affecting POC include: anemia, anxiety, dementia, and HTN and personal factors of: advanced age, mainly Persian speaking and long resident of SNF w/ non-ambulatory mobility status prior to admission  Pt's clinical presentation is currently unstable/unpredictable which is evident in abn lab values being monitored/trending, ongoing suppl O2 needs, ongoing telem monitoring incl CVP while in ICU, and inability to assess OOB mobility at this time  Pt presents w/ generalized weakness, decreased (L) LE PROM and (B) LE AROM, decreased (B) LE strength, decreased endurance and activity tolerance, impaired sitting balance, impaired overall LE tone, inability to progress to OOB transfers at this time and fall risk  Will cont to follow pt in PT for bed level activities at this time w/ progression to OOB mobility trials when clinically appropriate (PT to see the pt at that point)  Otherwise, anticipate pt will return back to SNF level of care upon D/C when medically cleared; PT follow up at SNF is recommended as pt's overall functional status appears to be below premorbid level  Will cont to follow until then     Goals   Patient Goals pt is unable to express   STG Expiration Date 08/13/18   Short Term Goal #1 7-10 days  Pt will achieve mod (A) x1 w/ bed mob in order to facilitate progression to OOB transitions in own living environment  Assess transfers when clinically appropriate  Pt will sustain at least fair - sitting supported balance  Pt will participate in LE therex and balance activities to max progression w/ mobility skills  Treatment Day 0   Plan   Treatment/Interventions Therapeutic exercise;Cognitive reorientation; Endurance training;Bed mobility;LE strengthening/ROM; Continued evaluation;Spoke to nursing;OT   PT Frequency 2-3x/wk   Recommendation   Recommendation Long-term skilled PT;Long-term skilled nursing home placement   Modified Mario Scale   Modified [place] Scale 5   Barthel Index   Feeding 0   Bathing 0   Grooming Score 0   Dressing Score 0   Bladder Score 0   Bowels Score 0   Toilet Use Score 0   Transfers (Bed/Chair) Score 0   Mobility (Level Surface) Score 0   Stairs Score 0   Barthel Index Score 0       Nando Estrella, PT

## 2018-08-03 NOTE — PLAN OF CARE
GENITOURINARY - ADULT     Absence of urinary retention Not Progressing        Nutrition/Hydration-ADULT     Nutrient/Hydration intake appropriate for improving, restoring or maintaining nutritional needs Not Progressing          CARDIOVASCULAR - ADULT     Maintains optimal cardiac output and hemodynamic stability Progressing     Absence of cardiac dysrhythmias or at baseline rhythm Progressing        DISCHARGE PLANNING     Discharge to home or other facility with appropriate resources Progressing        GASTROINTESTINAL - ADULT     Minimal or absence of nausea and/or vomiting Progressing     Maintains or returns to baseline bowel function Progressing     Maintains adequate nutritional intake Progressing        GENITOURINARY - ADULT     Maintains or returns to baseline urinary function Progressing     Urinary catheter remains patent Progressing        HEMATOLOGIC - ADULT     Maintains hematologic stability Progressing        INFECTION - ADULT     Absence or prevention of progression during hospitalization Progressing     Absence of fever/infection during neutropenic period Progressing        Knowledge Deficit     Patient/family/caregiver demonstrates understanding of disease process, treatment plan, medications, and discharge instructions Progressing        METABOLIC, FLUID AND ELECTROLYTES - ADULT     Electrolytes maintained within normal limits Progressing     Fluid balance maintained Progressing        PAIN - ADULT     Verbalizes/displays adequate comfort level or baseline comfort level Progressing        Potential for Falls     Patient will remain free of falls Progressing        Prexisting or High Potential for Compromised Skin Integrity     Skin integrity is maintained or improved Progressing        SAFETY ADULT     Maintain or return to baseline ADL function Progressing     Maintain or return mobility status to optimal level Progressing

## 2018-08-03 NOTE — OCCUPATIONAL THERAPY NOTE
Occupational Therapy Evaluation      Shira Aguayo    8/3/2018    Patient Active Problem List   Diagnosis    GI bleed    UTI (urinary tract infection)    Hypertension    Dementia    Acute blood loss anemia    GERD (gastroesophageal reflux disease)    Acute kidney injury (nontraumatic) (HCC)    Severe sepsis (HCC)    Benign prostatic hyperplasia with urinary retention       Past Medical History:   Diagnosis Date    Anemia     Anxiety     Dementia     Diverticulitis     GERD (gastroesophageal reflux disease)     GI bleed     Hyperlipidemia     Hypertension     Renal disorder     UTI (urinary tract infection)        Past Surgical History:   Procedure Laterality Date    ABDOMINAL SURGERY        08/03/18 1346   Note Type   Note type Eval only   Restrictions/Precautions   Other Precautions Fall Risk;Cognitive; Chair Alarm;Telemetry;Multiple lines;O2   Pain Assessment   Pain Assessment FLACC  (reports "bad back")   Pain Rating: FLACC (Rest) - Face 0   Pain Rating: FLACC (Rest) - Legs 0   Pain Rating: FLACC (Rest) - Activity 2   Pain Rating: FLACC (Rest) - Cry 1   Pain Rating: FLACC (Rest) - Consolability 1   Score: FLACC (Rest) 4   Pain Rating: FLACC (Activity) - Face 1   Pain Rating: FLACC (Activity) - Legs 1   Pain Rating: FLACC (Activity) - Activity 2   Pain Rating: FLACC (Activity) - Cry 1   Pain Rating: FLACC (Activity) - Consolability 1   Score: FLACC (Activity) 6   Home Living   Type of Home SNF   Home Layout One level   Prior Function   ADL Assistance Needs assistance   IADLs Needs assistance   Falls in the last 6 months (unknown )   Vocational Retired   Psychosocial   Psychosocial (WDL) WDL   Patient Behaviors/Mood (restless at times )   Subjective   Subjective " I have a bad back"   ADL   Eating Assistance 1  Total Assistance   Grooming Assistance 1  Total Assistance   Grooming Deficit (+ cough being fed by nurse, ?  ST consult was getting ST PTA )   UB Bathing Assistance 1  Total Assistance   LB Bathing Assistance 1  Total Assistance   [address] 1  Total Assistance   LB Dressing Assistance 1  Total Assistance   Toileting Assistance  1  Total Assistance   Toileting Deficit (noted pressure areas on sacrum - nursing aware )   Bed Mobility   Rolling R 2  Maximal assistance   Additional items Assist x 2;Assist x 1   Rolling L 2  Maximal assistance   Additional items Assist x 2;Assist x 1   Supine to Sit 2  Maximal assistance   Additional items Assist x 2   Sit to Supine 2  Maximal assistance   Additional items Assist x 2   Additional Comments (+ rigidity in trunk and all 4 extremities )   Transfers   Additional Comments (Not appropriate at this time )   Balance   Static Sitting Poor   Dynamic Sitting Poor -   Static Standing Zero   Activity Tolerance   Activity Tolerance Patient limited by fatigue  (Activity Tolerance: Poor )   Medical Staff Made Aware OT spoke with Dieter RN and KIMMIE Rose    RUE Assessment   RUE Assessment X  (shoulder 3-/5, elbow 3/5, wrist 3+/5, grasp 3+/5 + rididity )   LUE Assessment   LUE Assessment X  (shoulder 3-/5, elbow 3+/5, wrist 3+/5 grasp 3+/5  + rigidity)   Hand Function   Gross Motor Coordination Impaired   Fine Motor Coordination Impaired   Sensation   Light Touch No apparent deficits   Vision-Basic Assessment   Patient Visual Report (unable to fully assess, patient with hx of dementia )   Cognition   Overall Cognitive Status Impaired   Arousal/Participation Alert; Responsive   Attention Difficulty attending to directions   Orientation Level Oriented to person  (hx dementia, Marshallese is primary lanaguage )   Memory Decreased short term memory;Decreased recall of biographical information;Decreased long term memory   Following Commands Follows one step commands inconsistently  (inconsistent followig verbal and/or visual directions )   Assessment   Limitation Decreased endurance;Decreased self-care trans;Decreased UE ROM; Decreased UE strength;Decreased ADL status   Prognosis Fair   Assessment Patient is a 81 y/o male with hx of dementia admitted from [facility] with GIB  Patient is LTC resident, per CM patient is normally A x 1 with transfers and can propell wheelchair however unclear of PLOF of function with self-feeding and toileting  Patient currently requires A x 2 for all aspects of bed mobility and is limited with unsupported upright sitting for simple ADL tasks and transfers due to diffuse weakness, decreased postural control, rigidity and poor posture that is complicated by patients hx of dementia  From OT standpoint will continue to follow for acute care OT services to progrss basic transfer, toielting, bed mobility and feeding skills to [facility] level of indpendnece with least amount of caregiver assitance  Goals   Patient Goals unable to stte functional goals    LTG Time Frame 3-7   Long Term Goal #1 1  Patient will complete rolling r/l at min A for ADL care in bed with A x 1 caregiver; 2  Patient will increase tolerance to upright activity seated EOB or seated supported in chair to 30 min with F+ sitting balane and postural control; 3  Patient will complete self-feeding with Min A x 1 caregiver; 4  Patient will increase toileting to Mn A x 1 with use of bedside commode and Min A for transfer to/from commode    Plan   Treatment Interventions ADL retraining;Functional transfer training; Endurance training   Goal Expiration Date 08/10/18   OT Frequency 2-3x/wk   Recommendation   Recommendation PT consult;Speech consult   OT Discharge Recommendation (Return to facility with PT/OT/ST services )   Barthel Index   Feeding 0   Bathing 0   Grooming Score 0   Dressing Score 0   Bladder Score 0   Bowels Score 0   Toilet Use Score 0   Transfers (Bed/Chair) Score 0   Mobility (Level Surface) Score 0   Stairs Score 0   Barthel Index Score 0   Modified Mario Scale   Modified Mario Scale 5   Brett Found, OT

## 2018-08-03 NOTE — PLAN OF CARE
Problem: OCCUPATIONAL THERAPY ADULT  Goal: Performs self-care activities at highest level of function for planned discharge setting  See evaluation for individualized goals  Treatment Interventions: ADL retraining, Functional transfer training, Endurance training          See flowsheet documentation for full assessment, interventions and recommendations  Limitation: Decreased endurance, Decreased self-care trans, Decreased UE ROM, Decreased UE strength, Decreased ADL status  Prognosis: Fair  Assessment: Patient is a 79 y/o male with hx of dementia admitted from [facility] with GIB  Patient is LTC resident, per CM patient is normally A x 1 with transfers and can propell wheelchair however unclear of PLOF of function with self-feeding and toileting  Patient currently requires A x 2 for all aspects of bed mobility and is limited with unsupported upright sitting for simple ADL tasks and transfers due to diffuse weakness, decreased postural control, rigidity and poor posture that is complicated by patients hx of dementia  From OT standpoint will continue to follow for acute care OT services to progrss basic transfer, toielting, bed mobility and feeding skills to [facility] level of indpendnece with least amount of caregiver assitance      Recommendation: PT consult, Speech consult  OT Discharge Recommendation:  (Return to facility with PT/OT/ST services )

## 2018-08-03 NOTE — PLAN OF CARE
Problem: PHYSICAL THERAPY ADULT  Goal: Performs mobility at highest level of function for planned discharge setting  See evaluation for individualized goals  Treatment/Interventions: Therapeutic exercise, Cognitive reorientation, Endurance training, Bed mobility, LE strengthening/ROM, Continued evaluation, Spoke to nursing, OT          See flowsheet documentation for full assessment, interventions and recommendations  Outcome: Progressing  Prognosis: Guarded  Problem List: Decreased strength, Decreased range of motion, Decreased endurance, Impaired balance, Decreased mobility, Decreased coordination, Decreased cognition  Assessment: Additional bed level mobilization performed to facilitate pericare activities in light of current level of (A) required followed by repositioning in bed for comfort; pt cont to require extensive level of (A) w/ all bed level of mobility demonstrating increased (L) > (R) LE extensor tone (vs guarding; ? etiology) and associated lack of AROM in (B) LE; will cont to follow pt in PT for graded bed level mobilization as clinical course allows w/ progression to OOB mobility as clinically appropriate; cont to recommend PT follow up upon returning back to SNF level of care upon D/C;        Recommendation: Long-term skilled PT, Long-term skilled nursing home placement          See flowsheet documentation for full assessment

## 2018-08-03 NOTE — PHYSICAL THERAPY NOTE
PHYSICAL THERAPY NOTE          Patient Name: Alexus Trejo  [id number] Date: 8/3/2018     08/03/18 4575   Pain Assessment   Pain Assessment FLACC   Pain Type Chronic pain   Pain Location Back   Pain Descriptors Patient unable to describe   Pain Frequency Intermittent   Pain Onset Ongoing   Clinical Progression Not changed   Effect of Pain on Daily Activities no increased discomfort during the session   Patient's Stated Pain Goal No pain   Hospital Pain Intervention(s) Distraction; Emotional support;Repositioned   Response to Interventions appears to be comfortable at the end of session   Pain Rating: FLACC (Rest) - Face 0   Pain Rating: FLACC (Rest) - Legs 0   Pain Rating: FLACC (Rest) - Activity 0   Pain Rating: FLACC (Rest) - Cry 1   Pain Rating: FLACC (Rest) - Consolability 0   Score: FLACC (Rest) 1   Pain Rating: FLACC (Activity) - Face 1   Pain Rating: FLACC (Activity) - Legs 0   Pain Rating: FLACC (Activity) - Activity 0   Pain Rating: FLACC (Activity) - Cry 0   Pain Rating: FLACC (Activity) - Consolability 0   Score: FLACC (Activity) 1   Restrictions/Precautions   Other Precautions Fall Risk;O2;Telemetry;Multiple lines;Contact/isolation;Cognitive; Bed Alarm  (bed alarm activated post session)   General   Additional Pertinent History Additional follow up consecutive session performed to facilitate bed mob during pericare activities as pt was noted to be incontinent of bowel post initial assessment (nsg arrived); also for additional repositioning for comfort; Response to Previous Treatment Patient with no complaints from previous session  Cognition   Overall Cognitive Status Impaired   Arousal/Participation Alert; Cooperative   Attention Difficulty attending to directions   Orientation Level Oriented to person   Memory Decreased recall of recent events   Following Commands Follows one step commands with increased time or repetition   Subjective   Subjective Pt denies much pain; somewhat somnolent; still exhibits occasional coughing   Bed Mobility   Rolling R 2  Maximal assistance   Additional items Assist x 1;Assist x 2; Increased time required;Verbal cues;LE management   Rolling L 2  Maximal assistance   Additional items Assist x 1;Assist x 2; Increased time required;Verbal cues;LE management   Additional Comments Pt was repositioned higher in bed w/ (A)x2; foam wedge placed to offload (L) side of the trunk; SCDs back on; pillow for (R) UE support; call bell w/in reach; bed alarm on   Transfers   Sit to Stand Unable to assess   Activity Tolerance   Activity Tolerance Patient limited by fatigue;Treatment limited secondary to medical complications (Comment)  (O2 sat at 88 % post mobilization -> 95 % after 1 min of rest)   Nurse Made Aware spoke to NYASIA SALAZAR RN   Assessment   Prognosis Guarded   Problem List Decreased strength;Decreased range of motion;Decreased endurance; Impaired balance;Decreased mobility; Decreased coordination;Decreased cognition   Assessment Additional bed level mobilization performed to facilitate pericare activities in light of current level of (A) required followed by repositioning in bed for comfort; pt cont to require extensive level of (A) w/ all bed level of mobility demonstrating increased (L) > (R) LE extensor tone (vs guarding; ? etiology) and associated lack of AROM in (B) LE; will cont to follow pt in PT for graded bed level mobilization as clinical course allows w/ progression to OOB mobility as clinically appropriate; cont to recommend PT follow up upon returning back to SNF level of care upon D/C;   Goals   Patient Goals pt is unable to state   STG Expiration Date 08/13/18   Treatment Day 1   Plan   Treatment/Interventions LE strengthening/ROM; Therapeutic exercise; Endurance training;Bed mobility;Continued evaluation;Spoke to nursing;OT   Progress Slow progress, decreased activity tolerance   PT Frequency 2-3x/wk   Recommendation   Recommendation Long-term skilled PT;Long-term skilled nursing home placement       Raymundo Marie, PT

## 2018-08-03 NOTE — PROGRESS NOTES
Progress Note - Pramod Juarez 80 y o  male MRN: [id number]    Unit/Bed#: -02 Encounter: [id number]      Assessment:    Principal Problem:    GI bleed  Active Problems:    UTI (urinary tract infection)    Hypertension    Dementia    Acute blood loss anemia    GERD (gastroesophageal reflux disease)    Acute kidney injury (nontraumatic) (HCC)    Severe sepsis (HCC)    Benign prostatic hyperplasia with urinary retention  Resolved Problems:    * No resolved hospital problems  *      Plan: Will place on clear liquids  Patient's family is side do not want any endoscopy procedures  Continue every 12 hour H&Hs  Will transfer to [facility] floor  Placed on p o  Protonix  Continue treatment empirically for E coli urinary tract infection  Sensitivities pending  Restart the metoprolol the patient was on as an outpatient since he is now hemodynamically stable  Subjective:   Much more alert awake  Wants a cup of coffee  Objective:     Vitals: Blood pressure 110/54, pulse 84, temperature 99 3 °F (37 4 °C), resp  rate 20, weight 61 kg (134 lb 7 7 oz), SpO2 97 %  ,There is no height or weight on file to calculate BMI        Intake/Output Summary (Last 24 hours) at 08/03/18 1000  Last data filed at 08/03/18 5042   Gross per 24 hour   Intake           779 77 ml   Output             2360 ml   Net         -1580 23 ml       Physical Exam:    Alert and awake in no acute distress  Lungs clear to auscultation bilaterally  Heart regular rate and rythm, systolic murmur  Abdomen soft, active bowel sounds, non-tender, non-distended  Extremities: no cyanosis, clubbing or edema        Invasive Devices     Central Venous Catheter Line            CVC Central Lines 08/01/18 Triple Left Subclavian 1 day          Peripheral Intravenous Line            Peripheral IV 08/01/18 Left Antecubital 1 day    Peripheral IV 08/01/18 Right Antecubital 1 day          Drain            Urethral Catheter Temperature probe 16 Fr  1 day Lab, Imaging and other studies: I have personally reviewed pertinent reports  Results from last 7 days  Lab Units 08/03/18  0356 08/02/18  2045 08/02/18  1757  08/02/18  0430  08/01/18  1133   WBC Thousand/uL  --   --   --   --  3 09*  --  2 19*   HEMOGLOBIN g/dL 7 6* 7 7* 7 7*  < > 7 5*  < > 6 5*   HEMATOCRIT % 24 8* 24 7* 25 4*  < > 24 1*  < > 22 0*   PLATELETS Thousands/uL  --   --   --   --  251  --  304   LYMPHO PCT %  --   --   --   --  82*  --  88*   MONO PCT MAN %  --   --   --   --  3*  --  7   EOSINO PCT MANUAL %  --   --   --   --  0  --  0   < > = values in this interval not displayed  Results from last 7 days  Lab Units 08/03/18  0528 08/02/18  0430 08/01/18  1133   SODIUM mmol/L 138 135* 132*   POTASSIUM mmol/L 4 4 4 7 4 9   CHLORIDE mmol/L 107 104 99*   CO2 mmol/L 22 21 23   BUN mg/dL 29* 33* 35*   CREATININE mg/dL 1 43* 1 62* 2 08*   CALCIUM mg/dL 7 8* 7 3* 8 0*   TOTAL PROTEIN g/dL  --   --  7 5   BILIRUBIN TOTAL mg/dL  --   --  0 40   ALK PHOS U/L  --   --  78   ALT U/L  --   --  20   AST U/L  --   --  33   GLUCOSE RANDOM mg/dL 112 128 149*     Lab Results   Component Value Date    TROPONINI 0 18 (H) 08/01/2018    TROPONINI 0 20 (H) 08/01/2018       Results from last 7 days  Lab Units 08/02/18  0430 08/01/18  1133   INR  1 57* 1 51*     Lab Results   Component Value Date    BLOODCX No Growth at 24 hrs  08/01/2018    BLOODCX No Growth at 24 hrs  08/01/2018    URINECX >100,000 cfu/ml Escherichia coli (A) 08/01/2018       Imaging:  Results for orders placed during the hospital encounter of 08/01/18   XR chest 1 view portable    Narrative CHEST     INDICATION:   Central line placement  COMPARISON:  Earlier today  EXAM PERFORMED/VIEWS:  XR CHEST PORTABLE      FINDINGS:  Left subclavian central line has been placed terminating in the SVC  Cardiomediastinal silhouette appears stable  Interstitial prominence with bibasilar atelectasis unchanged      Osseous structures appear within normal limits for patient age  Impression Left subclavian central line terminating in the SVC without pneumothorax  Persistent vascular congestion and bibasilar atelectasis  Workstation performed: [id number]       Results for orders placed during the hospital encounter of 08/01/18   X-ray chest 2 views    Narrative CHEST     INDICATION:   Shortness of breath  COMPARISON:  None    EXAM PERFORMED/VIEWS:  XR CHEST PA & LATERAL      FINDINGS:    Cardiac silhouette normal in size  Mild interstitial prominence suggesting interstitial edema  No consolidation or pneumothorax  Osseous structures appear within normal limits for patient age  Impression Mild interstitial prominence suggests interstitial edema  Workstation performed: [id number]         PATIENT CENTERED ROUNDS: I have performed rounds with the nursing staff  This note has been constructed using a voice recognition system      Anisa Sherwood MD

## 2018-08-03 NOTE — PLAN OF CARE
Problem: PHYSICAL THERAPY ADULT  Goal: Performs mobility at highest level of function for planned discharge setting  See evaluation for individualized goals  Treatment/Interventions: Therapeutic exercise, Cognitive reorientation, Endurance training, Bed mobility, LE strengthening/ROM, Continued evaluation, Spoke to nursing, OT          See flowsheet documentation for full assessment, interventions and recommendations  Prognosis: Guarded  Problem List: Decreased strength, Decreased range of motion, Decreased endurance, Impaired balance, Decreased mobility, Decreased coordination, Decreased cognition, Impaired tone  Assessment: Bed level assessment/mobilization initiated  Pt is 80 y o  male admitted with hx of GI bleed and currently undergoing w/u  Pt 's comorbidities affecting POC include: anemia, anxiety, dementia, and HTN and personal factors of: advanced age, mainly Latvian speaking and long resident of SNF w/ non-ambulatory mobility status prior to admission  Pt's clinical presentation is currently unstable/unpredictable which is evident in abn lab values being monitored/trending, ongoing suppl O2 needs, ongoing telem monitoring incl CVP while in ICU, and inability to assess OOB mobility at this time  Pt presents w/ generalized weakness, decreased (L) LE PROM and (B) LE AROM, decreased (B) LE strength, decreased endurance and activity tolerance, impaired sitting balance, impaired overall LE tone, inability to progress to OOB transfers at this time and fall risk  Will cont to follow pt in PT for bed level activities at this time w/ progression to OOB mobility trials when clinically appropriate (PT to see the pt at that point)  Otherwise, anticipate pt will return back to SNF level of care upon D/C when medically cleared; PT follow up at SNF is recommended as pt's overall functional status appears to be below premorbid level  Will cont to follow until then          Recommendation: Long-term skilled PT, Long-term skilled nursing home placement          See flowsheet documentation for full assessment

## 2018-08-03 NOTE — PROGRESS NOTES
Thu Pichardo  [id number]    74 y o   male      ASSESSMENT  1  Anemia, recurrent with a history of heme-positive stool  No overt GI blood loss noted  Hemoglobin in the 6 g range and archie to 7 5 g following transfusion with 2 units of packed red blood cells  INR also slightly elevated and he was given vitamin K  Presented to [facility] in March with the same and underwent an upper endoscopy that did not show any active upper GI bleeding site  A colonoscopy was also performed at that time that showed polyps that were not removed and  a stercoral ulcer  Bleeding scan was also performed without any obvious GI bleeding source  No rectal bleeding or melena  Had 1 formed brown bowel movement last evening  PLAN  1  Follow H&H  2  PPI for stress ulcer prophylaxis  3  Continue bowel protocol  4  Holding on endoscopic procedures unless overt GI blood loss noted or a drastic drop in hemoglobin occurs  Discussed with patient's son Henry Ochoa yesterday  Chief Complaint   Patient presents with    Fever - 75 years or older     To ED via EMS for evaluation of fever x 2 days per staff at Facility  Patient noted to have increased confusion  SUBJECTIVE/HPI   No melena or rectal bleeding  No vomiting      /57   Pulse 82   Temp 99 3 °F (37 4 °C)   Resp 18   Wt 61 kg (134 lb 7 7 oz)   SpO2 97%       PHYSICALEXAM  Constitutional:  Elderly male, frail, in no acute distress  Eyes:  conjunctiva pale  HENT:  Atraumatic  Respiratory:  No respiratory distress  Cardiovascular:  Normal rate  GI:  Soft, nondistended, normal bowel sounds, nontender  Musculoskeletal:  No edema  Neurologic:  Awake and oriented x1      Lab Results   Component Value Date    GLUCOSE 112 08/03/2018    CALCIUM 7 8 (L) 08/03/2018     08/03/2018    K 4 4 08/03/2018    CO2 22 08/03/2018     08/03/2018    BUN 29 (H) 08/03/2018    CREATININE 1 43 (H) 08/03/2018     Lab Results   Component Value Date    WBC 3 09 (L) 08/02/2018 HGB 7 6 (L) 08/03/2018    HCT 24 8 (L) 08/03/2018    MCV 93 08/02/2018     08/02/2018     Lab Results   Component Value Date    ALT 20 08/01/2018    AST 33 08/01/2018    ALKPHOS 78 08/01/2018    BILITOT 0 40 08/01/2018     No results found for: AMYLASE  No results found for: LIPASE  No results found for: IRON, TIBC, FERRITIN  Lab Results   Component Value Date    INR 1 57 (H) 08/02/2018       Counseling / Coordination of Care  Total floor / unit time spent today 25 minutes  Greater than 50% of total time was spent with the patient and / or family counseling and / or coordination of care  A description of the counseling / coordination of care: Zee Garcia

## 2018-08-04 PROBLEM — G30.1 LATE ONSET ALZHEIMER'S DISEASE WITHOUT BEHAVIORAL DISTURBANCE (HCC): Status: ACTIVE | Noted: 2018-08-01

## 2018-08-04 PROBLEM — F02.80 LATE ONSET ALZHEIMER'S DISEASE WITHOUT BEHAVIORAL DISTURBANCE (HCC): Status: ACTIVE | Noted: 2018-08-01

## 2018-08-04 LAB
ANION GAP SERPL CALCULATED.3IONS-SCNC: 8 MMOL/L (ref 4–13)
BASOPHILS # BLD MANUAL: 0 THOUSAND/UL (ref 0–0.1)
BASOPHILS NFR MAR MANUAL: 0 % (ref 0–1)
BUN SERPL-MCNC: 32 MG/DL (ref 5–25)
CALCIUM SERPL-MCNC: 7.7 MG/DL (ref 8.3–10.1)
CHLORIDE SERPL-SCNC: 108 MMOL/L (ref 100–108)
CO2 SERPL-SCNC: 23 MMOL/L (ref 21–32)
CREAT SERPL-MCNC: 1.55 MG/DL (ref 0.6–1.3)
EOSINOPHIL # BLD MANUAL: 0 THOUSAND/UL (ref 0–0.4)
EOSINOPHIL NFR BLD MANUAL: 0 % (ref 0–6)
ERYTHROCYTE [DISTWIDTH] IN BLOOD BY AUTOMATED COUNT: 19.1 % (ref 11.6–15.1)
GFR SERPL CREATININE-BSD FRML MDRD: 38 ML/MIN/1.73SQ M
GLUCOSE SERPL-MCNC: 109 MG/DL (ref 65–140)
HCT VFR BLD AUTO: 24.5 % (ref 36.5–49.3)
HGB BLD-MCNC: 7.6 G/DL (ref 12–17)
IRON SATN MFR SERPL: 12 %
IRON SERPL-MCNC: 23 UG/DL (ref 65–175)
LYMPHOCYTES # BLD AUTO: 2.13 THOUSAND/UL (ref 0.6–4.47)
LYMPHOCYTES # BLD AUTO: 85 % (ref 14–44)
MCH RBC QN AUTO: 29.2 PG (ref 26.8–34.3)
MCHC RBC AUTO-ENTMCNC: 31 G/DL (ref 31.4–37.4)
MCV RBC AUTO: 94 FL (ref 82–98)
MONOCYTES # BLD AUTO: 0.13 THOUSAND/UL (ref 0–1.22)
MONOCYTES NFR BLD: 5 % (ref 4–12)
NEUTROPHILS # BLD MANUAL: 0.08 THOUSAND/UL (ref 1.85–7.62)
NEUTS SEG NFR BLD AUTO: 3 % (ref 43–75)
PLATELET # BLD AUTO: 212 THOUSANDS/UL (ref 149–390)
PLATELET BLD QL SMEAR: ADEQUATE
PMV BLD AUTO: 12 FL (ref 8.9–12.7)
POTASSIUM SERPL-SCNC: 4.7 MMOL/L (ref 3.5–5.3)
RBC # BLD AUTO: 2.6 MILLION/UL (ref 3.88–5.62)
RBC MORPH BLD: NORMAL
SODIUM SERPL-SCNC: 139 MMOL/L (ref 136–145)
TIBC SERPL-MCNC: 193 UG/DL (ref 250–450)
TOTAL CELLS COUNTED SPEC: 100
VARIANT LYMPHS # BLD AUTO: 7 %
VIT B12 SERPL-MCNC: 490 PG/ML (ref 100–900)
WBC # BLD AUTO: 2.51 THOUSAND/UL (ref 4.31–10.16)

## 2018-08-04 PROCEDURE — 99232 SBSQ HOSP IP/OBS MODERATE 35: CPT | Performed by: INTERNAL MEDICINE

## 2018-08-04 PROCEDURE — 83540 ASSAY OF IRON: CPT | Performed by: INTERNAL MEDICINE

## 2018-08-04 PROCEDURE — 83550 IRON BINDING TEST: CPT | Performed by: INTERNAL MEDICINE

## 2018-08-04 PROCEDURE — 82607 VITAMIN B-12: CPT | Performed by: INTERNAL MEDICINE

## 2018-08-04 PROCEDURE — 85027 COMPLETE CBC AUTOMATED: CPT | Performed by: INTERNAL MEDICINE

## 2018-08-04 PROCEDURE — 85007 BL SMEAR W/DIFF WBC COUNT: CPT | Performed by: INTERNAL MEDICINE

## 2018-08-04 PROCEDURE — 80048 BASIC METABOLIC PNL TOTAL CA: CPT | Performed by: INTERNAL MEDICINE

## 2018-08-04 RX ADMIN — CHLORHEXIDINE GLUCONATE 0.12% ORAL RINSE 15 ML: 1.2 LIQUID ORAL at 09:46

## 2018-08-04 RX ADMIN — CEFEPIME HYDROCHLORIDE 1000 MG: 1 INJECTION, SOLUTION INTRAVENOUS at 02:38

## 2018-08-04 RX ADMIN — PANTOPRAZOLE SODIUM 40 MG: 40 TABLET, DELAYED RELEASE ORAL at 06:08

## 2018-08-04 RX ADMIN — CEFAZOLIN SODIUM 1000 MG: 1 SOLUTION INTRAVENOUS at 12:09

## 2018-08-04 RX ADMIN — METOPROLOL TARTRATE 25 MG: 25 TABLET ORAL at 21:30

## 2018-08-04 RX ADMIN — METOPROLOL TARTRATE 25 MG: 25 TABLET ORAL at 09:46

## 2018-08-04 NOTE — PROGRESS NOTES
Progress Note - GI   Navid Gibbs 80 y o  male MRN: [id number]  Unit/Bed#: -02 Encounter: [id number]      ASSESSMENT  · Anemia - likely multifactorial   Previous evaluation for GI source several months ago with an unremarkable EGD, colonoscopy with polyps and a stercoral ulcer  Negative bleeding scan  No clinical signs of acute bleeding presently  PLAN  · Follow H&H and transfuse as needed  · Continue PPI  · Diet as tolerated  · Hold on any invasive/endoscopic procedures    Chief Complaint   Patient presents with    Fever - 75 years or older     To ED via EMS for evaluation of fever x 2 days per staff at [facility] 74  Patient noted to have increased confusion  SUBJECTIVE/HPI   Comfortable up in chair  No apparent distress  Taking p o  with assistance  No melena or hematochezia reported  /63 (BP Location: Right arm)   Pulse 77   Temp 98 3 °F (36 8 °C) (Oral)   Resp 22   Wt 61 4 kg (135 lb 5 8 oz)   SpO2 99%       PHYSICALEXAM    Awake but confused  Abdomen soft and nontender without rebound or guarding        Lab Results   Component Value Date    GLUCOSE 109 08/04/2018    CALCIUM 7 7 (L) 08/04/2018     08/04/2018    K 4 7 08/04/2018    CO2 23 08/04/2018     08/04/2018    BUN 32 (H) 08/04/2018    CREATININE 1 55 (H) 08/04/2018     Lab Results   Component Value Date    WBC 2 51 (L) 08/04/2018    HGB 7 6 (L) 08/04/2018    HCT 24 5 (L) 08/04/2018    MCV 94 08/04/2018     08/04/2018     Lab Results   Component Value Date    ALT 20 08/01/2018    AST 33 08/01/2018    ALKPHOS 78 08/01/2018    BILITOT 0 40 08/01/2018     No components found for: AMYLJKJJJASE  No results found for: LIPASE  No results found for: IRON, TIBC, FERRITIN  Lab Results   Component Value Date    INR 1 57 (H) 08/02/2018         Asia Barrera MD

## 2018-08-04 NOTE — PLAN OF CARE
CARDIOVASCULAR - ADULT     Maintains optimal cardiac output and hemodynamic stability Progressing     Absence of cardiac dysrhythmias or at baseline rhythm Progressing        DISCHARGE PLANNING     Discharge to home or other facility with appropriate resources Progressing        GASTROINTESTINAL - ADULT     Minimal or absence of nausea and/or vomiting Progressing     Maintains or returns to baseline bowel function Progressing     Maintains adequate nutritional intake Progressing        GENITOURINARY - ADULT     Maintains or returns to baseline urinary function Progressing     Absence of urinary retention Progressing     Urinary catheter remains patent Progressing        HEMATOLOGIC - ADULT     Maintains hematologic stability Progressing        INFECTION - ADULT     Absence or prevention of progression during hospitalization Progressing     Absence of fever/infection during neutropenic period Progressing        Knowledge Deficit     Patient/family/caregiver demonstrates understanding of disease process, treatment plan, medications, and discharge instructions Progressing        METABOLIC, FLUID AND ELECTROLYTES - ADULT     Electrolytes maintained within normal limits Progressing     Fluid balance maintained Progressing        Nutrition/Hydration-ADULT     Nutrient/Hydration intake appropriate for improving, restoring or maintaining nutritional needs Progressing        PAIN - ADULT     Verbalizes/displays adequate comfort level or baseline comfort level Progressing        Potential for Falls     Patient will remain free of falls Progressing        Prexisting or High Potential for Compromised Skin Integrity     Skin integrity is maintained or improved Progressing        SAFETY ADULT     Maintain or return to baseline ADL function Progressing     Maintain or return mobility status to optimal level Progressing

## 2018-08-04 NOTE — ASSESSMENT & PLAN NOTE
Patient met criteria for severe sepsis on admission as evidenced by temperature 101 2°, heart of 98, respiratory of 30, leukocyte count 2 19 and lactic acid level 3 5  This was due to E coli UTI associated with indwelling Massey catheter      Will switch patient to IV cefazolin

## 2018-08-04 NOTE — ASSESSMENT & PLAN NOTE
Patient appears to be more confused today as per patient's nurse  He is also more sleepy and does not eat  We have not giving him any sedatives, hypoxic, pain medications that could cause this for patient will monitor patient's hemoglobin to make sure that it is not dropping, anemia could contribute to his sleepiness  Will continue antibiotics for treatment of UTI      I tried to call patient's son Alejandra Cano, left a message on the answering machine to call me back

## 2018-08-04 NOTE — ASSESSMENT & PLAN NOTE
Patient's nurse reports no GI bleed  Hemoglobin is 7 6, patient has very poor p   O  intake as per patient's nurse  Continue Protonix 40 mg p o  b i d

## 2018-08-04 NOTE — PROGRESS NOTES
Patient received and care transferred from [facility]  Patient in room 217 b  Pt somnolent  RAC and LAC iv's capped  L TLC SC Central line capped  Pt intermittently following commands, and intermittently speaking english and German  Pt has O2 nasal cannula off- reapplied  SCDs in place  Pt repositioned  Oral care rendered  Massey intact and secured  AVSS   Pt appears in no acute distress

## 2018-08-04 NOTE — PROGRESS NOTES
Progress Note - Chandler Kendall 6/11/1924, 80 y o  male MRN: [id number]    Unit/Bed#: -02 Encounter: [id number]    Primary Care Provider: Shamika Chavez MD   Date and time admitted to hospital: 8/1/2018 11:25 AM        * GI bleed   Assessment & Plan    Patient's nurse reports no GI bleed  Hemoglobin is 7 6, patient has very poor p   O  intake as per patient's nurse  Continue Protonix 40 mg p o  b i d  Severe sepsis [facility])   Assessment & Plan    Patient met criteria for severe sepsis on admission as evidenced by temperature 101 2°, heart of 98, respiratory of 30, leukocyte count 2 19 and lactic acid level 3 5  This was due to E coli UTI associated with indwelling Massey catheter  Will switch patient to IV cefazolin        Acute blood loss anemia   Assessment & Plan    Hemoglobin is 7 6 after administration path blood cells  UTI (urinary tract infection)   Assessment & Plan    Patient's UTI is associated with chronic indwelling Massey! Will switch to IV cefazolin        Acute kidney injury (nontraumatic) ([facility] [address] )   Assessment & Plan    Patient's creatinine decreased to 1 55 after hydration and blood transfusion  Will monitor renal function with serial blood work         Benign prostatic hyperplasia with urinary retention   Assessment & Plan    Will continue chronic Massey catheter        Late onset Alzheimer's disease without behavioral disturbance   Assessment & Plan    Patient appears to be more confused today as per patient's nurse  He is also more sleepy and does not eat  We have not giving him any sedatives, hypoxic, pain medications that could cause this for patient will monitor patient's hemoglobin to make sure that it is not dropping, anemia could contribute to his sleepiness  Will continue antibiotics for treatment of UTI      I tried to call patient's son Souleymane Smith, left a message on the answering machine to call me back            VTE Prophylaxis: in place    Patient Centered Rounds: I rounded with patient's nurse    Current Length of Stay: 3 day(s)    Current Patient Status: Inpatient    Certification Statement: Pt requires additional inpatient hospital stay due to: see assessment and plan        Subjective:   Patient's nurse reports that patient was confused last night and has a poor appetite not eating much this morning  Patient had no visible signs of bleeding  Patient had no hypoxia, no seizures seen  He had no vomiting, diarrhea  All other ROS are negative    Objective:     Vitals:   Temp (24hrs), Av 5 °F (36 9 °C), Min:97 8 °F (36 6 °C), Max:100 °F (37 8 °C)    HR:  [] 77  Resp:  [22-36] 22  BP: (108-131)/(56-63) 131/63  SpO2:  [90 %-99 %] 99 %  There is no height or weight on file to calculate BMI  Input and Output Summary (last 24 hours): Intake/Output Summary (Last 24 hours) at 18 1144  Last data filed at 18 0601   Gross per 24 hour   Intake              263 ml   Output             1060 ml   Net             -797 ml       Physical Exam:     Physical Exam   Constitutional: He appears well-developed  HENT:   Head: Normocephalic  Eyes: Conjunctivae are normal    Neck: Neck supple  No JVD present  Cardiovascular: Normal rate and regular rhythm  Pulmonary/Chest: Effort normal and breath sounds normal  He has no wheezes  Abdominal: Soft  There is no tenderness  Musculoskeletal: He exhibits no edema  Neurological:   Sleepy but easily arousable to verbal stimuli, answers in Irish to my questions   strength equal   Skin: Skin is warm and dry  I personally reviewed labs and imaging reports for today        Last 24 Hours Medication List:     Current Facility-Administered Medications:  cefepime 1,000 mg Intravenous Q12H Anisa Sherwood MD Last Rate: 1,000 mg (18 0238)   metoprolol tartrate 25 mg Oral Q12H [address] Anisa Sherwood MD    pantoprazole 40 mg Oral BID AC Anisa Sherwood MD    phenol 1 spray Mouth/Throat Q2H PRN ALPESH Syed           Today, Patient Was Seen By: Ric Velazquez MD    ** Please Note: Dictation voice to text software may have been used in the creation of this document   **

## 2018-08-04 NOTE — ASSESSMENT & PLAN NOTE
Patient's creatinine decreased to 1 55 after hydration and blood transfusion      Will monitor renal function with serial blood work

## 2018-08-05 ENCOUNTER — APPOINTMENT (INPATIENT)
Dept: CT IMAGING | Facility: HOSPITAL | Age: 83
DRG: 698 | End: 2018-08-05
Payer: COMMERCIAL

## 2018-08-05 LAB
ANION GAP SERPL CALCULATED.3IONS-SCNC: 6 MMOL/L (ref 4–13)
BUN SERPL-MCNC: 32 MG/DL (ref 5–25)
CALCIUM SERPL-MCNC: 8.2 MG/DL (ref 8.3–10.1)
CHLORIDE SERPL-SCNC: 109 MMOL/L (ref 100–108)
CO2 SERPL-SCNC: 24 MMOL/L (ref 21–32)
CREAT SERPL-MCNC: 1.41 MG/DL (ref 0.6–1.3)
ERYTHROCYTE [DISTWIDTH] IN BLOOD BY AUTOMATED COUNT: 19 % (ref 11.6–15.1)
GFR SERPL CREATININE-BSD FRML MDRD: 42 ML/MIN/1.73SQ M
GLUCOSE SERPL-MCNC: 146 MG/DL (ref 65–140)
HCT VFR BLD AUTO: 25.5 % (ref 36.5–49.3)
HGB BLD-MCNC: 7.6 G/DL (ref 12–17)
MCH RBC QN AUTO: 28.8 PG (ref 26.8–34.3)
MCHC RBC AUTO-ENTMCNC: 29.8 G/DL (ref 31.4–37.4)
MCV RBC AUTO: 97 FL (ref 82–98)
PLATELET # BLD AUTO: 194 THOUSANDS/UL (ref 149–390)
PMV BLD AUTO: 12.2 FL (ref 8.9–12.7)
POTASSIUM SERPL-SCNC: 4.8 MMOL/L (ref 3.5–5.3)
RBC # BLD AUTO: 2.64 MILLION/UL (ref 3.88–5.62)
SODIUM SERPL-SCNC: 139 MMOL/L (ref 136–145)
WBC # BLD AUTO: 2.45 THOUSAND/UL (ref 4.31–10.16)

## 2018-08-05 PROCEDURE — 99233 SBSQ HOSP IP/OBS HIGH 50: CPT | Performed by: INTERNAL MEDICINE

## 2018-08-05 PROCEDURE — 80048 BASIC METABOLIC PNL TOTAL CA: CPT | Performed by: INTERNAL MEDICINE

## 2018-08-05 PROCEDURE — 85027 COMPLETE CBC AUTOMATED: CPT | Performed by: INTERNAL MEDICINE

## 2018-08-05 PROCEDURE — 70450 CT HEAD/BRAIN W/O DYE: CPT

## 2018-08-05 RX ADMIN — METOPROLOL TARTRATE 25 MG: 25 TABLET ORAL at 09:09

## 2018-08-05 RX ADMIN — CEFAZOLIN SODIUM 1000 MG: 1 SOLUTION INTRAVENOUS at 12:04

## 2018-08-05 RX ADMIN — CEFAZOLIN SODIUM 1000 MG: 1 SOLUTION INTRAVENOUS at 00:09

## 2018-08-05 RX ADMIN — PANTOPRAZOLE SODIUM 40 MG: 40 TABLET, DELAYED RELEASE ORAL at 06:31

## 2018-08-05 RX ADMIN — HYDROMORPHONE HYDROCHLORIDE 0.5 MG: 1 INJECTION, SOLUTION INTRAMUSCULAR; INTRAVENOUS; SUBCUTANEOUS at 15:31

## 2018-08-05 NOTE — ASSESSMENT & PLAN NOTE
Patient's nurse reports no GI bleed  Hemoglobin is stable at 7 6, patient 75% of his dinner last night  Continue Protonix 40 mg p o  B i d      No GI procedure is planned

## 2018-08-05 NOTE — PROGRESS NOTES
Progress Note - Arturo Reyna 6/11/1924, 80 y o  male MRN: [id number]    Unit/Bed#: -02 Encounter: [id number]    Primary Care Provider: Dario Mccormick MD   Date and time admitted to hospital: 8/1/2018 11:25 AM        * GI bleed   Assessment & Plan    Patient's nurse reports no GI bleed  Hemoglobin is stable at 7 6, patient 75% of his dinner last night  Continue Protonix 40 mg p o  B i d  No GI procedure is planned         Severe sepsis [facility])   Assessment & Plan    Patient met criteria for severe sepsis on admission as evidenced by temperature 101 2°, heart of 98, respiratory of 30, leukocyte count 2 19 and lactic acid level 3 5  This was due to E coli UTI associated with indwelling Massey catheter  Continue IV cefazolin 1 g b i d  Acute blood loss anemia   Assessment & Plan    Hemoglobin is stable at 7 6 after packed red blood cell transfusion        UTI (urinary tract infection)   Assessment & Plan    Patient's UTI is associated with chronic indwelling Massey! Continue IV cefazolin        Acute kidney injury (nontraumatic) ([address] )   Assessment & Plan    Patient's creatinine decreased to 1 41 after hydration and blood transfusion  Will monitor renal function with serial blood work         Benign prostatic hyperplasia with urinary retention   Assessment & Plan    Will continue chronic Massey catheter        Late onset Alzheimer's disease without behavioral disturbance   Assessment & Plan    Patient is still quite sleepy and on physical examination he does not appear to move the right arm  Will get CT of the head knee showed and had CVA      I spoke with patient's son Pebbles Cam on the phone and made him aware of patient's situation and the plan of care            VTE Prophylaxis: in place    Patient Centered Rounds: I rounded with patient's nurse    Current Length of Stay: 4 day(s)    Current Patient Status: Inpatient    Certification Statement: Pt requires additional inpatient hospital stay due to: see assessment and plan        Subjective:   Patient's nurse reports that patient was more awake yesterday in the evening and 875% of his dinner  He had no coughing or choking episode when swallowing  Patient had no vomiting or diarrhea or signs of GI bleeding  Patient no seizure  I am unable to obtain history from patient who is a bit more awake and moves more than yesterday    All other ROS are negative    Objective:     Vitals:   Temp (24hrs), Av 9 °F (36 6 °C), Min:97 9 °F (36 6 °C), Max:98 °F (36 7 °C)    HR:  [67-75] 75  Resp:  [18-20] 18  BP: (101-148)/(51-82) 145/82  SpO2:  [96 %-99 %] 96 %  There is no height or weight on file to calculate BMI  Input and Output Summary (last 24 hours): Intake/Output Summary (Last 24 hours) at 18 0812  Last data filed at 18 0534   Gross per 24 hour   Intake               50 ml   Output             1000 ml   Net             -950 ml       Physical Exam:     Physical Exam   HENT:   Head: Normocephalic  Eyes: Conjunctivae are normal    Neck: Neck supple  No JVD present  Cardiovascular: Normal rate and regular rhythm  Pulmonary/Chest: Effort normal and breath sounds normal  No respiratory distress  Abdominal: Soft  Bowel sounds are normal  He exhibits no distension  There is no guarding  Neurological:   Sleepy at times opens eyes spontaneously and looks around  There is no facial droop  Moves left arm that does not appear to move the right arm, no seizure activity seen   Skin: Skin is warm and dry  No rash noted  I personally reviewed labs and imaging reports for today        Last 24 Hours Medication List:     Current Facility-Administered Medications:  cefazolin 1,000 mg Intravenous Q12H Yassine Bazan MD Last Rate: 1,000 mg (18 000)   metoprolol tartrate 25 mg Oral Q12H [facility] Khloe Blake MD    pantoprazole 40 mg Oral BID  Khloe Blake MD    phenol 1 spray Mouth/Throat Q2H PRN ALPESH Martinez Today, Patient Was Seen By: Nayeli Boothe MD    ** Please Note: Dictation voice to text software may have been used in the creation of this document   **

## 2018-08-05 NOTE — PROGRESS NOTES
Patient ate only 5% of his lunch otherwise pre much is nonverbal   I spoke with patient's son Saurabh Cates and his wife who informed me that the last 8 months patient has been in and out of hospitals with pneumonia as due to aspiration and urinary tract infections and his condition is that of gradual decline  Patient voiced his wish to not continue living like this before and be allowed natural death  Family requests hospice evaluation that I ordered for tomorrow

## 2018-08-05 NOTE — ASSESSMENT & PLAN NOTE
Patient met criteria for severe sepsis on admission as evidenced by temperature 101 2°, heart of 98, respiratory of 30, leukocyte count 2 19 and lactic acid level 3 5  This was due to E coli UTI associated with indwelling Massey catheter  Continue IV cefazolin 1 g b i d

## 2018-08-05 NOTE — ASSESSMENT & PLAN NOTE
Patient's creatinine decreased to 1 41 after hydration and blood transfusion      Will monitor renal function with serial blood work

## 2018-08-05 NOTE — PROGRESS NOTES
Progress Note - GI   Navid Gibbs 80 y o  male MRN: [id number]  Unit/Bed#: -02 Encounter: [id number]      ASSESSMENT  · Anemia - multifactorial   No evidence of acute GI bleeding  No endoscopic interventions indicated  Follow clinically  PLAN  · Follow H&H and transfuse as needed  · Empiric PPI  · If unable to take p o  due to mental status may need to consider PEG tube versus hospice/comfort measures  Chief Complaint   Patient presents with    Fever - 75 years or older     To ED via EMS for evaluation of fever x 2 days per staff at Facility  Patient noted to have increased confusion  SUBJECTIVE/HPI   Lethargic but arousable  No apparent pain  No bowel movements melena or hematochezia reported  Nursing does report generally poor p o  intake related to lethargy  /61 (BP Location: Right arm)   Pulse 62   Temp (!) 97 4 °F (36 3 °C) (Oral)   Resp 16   Wt 61 4 kg (135 lb 5 8 oz)   SpO2 97%       PHYSICALEXAM    Lethargic but arousable  Abdomen soft and nontender without rebound guarding or mass        Lab Results   Component Value Date    GLUCOSE 146 (H) 08/05/2018    CALCIUM 8 2 (L) 08/05/2018     08/05/2018    K 4 8 08/05/2018    CO2 24 08/05/2018     (H) 08/05/2018    BUN 32 (H) 08/05/2018    CREATININE 1 41 (H) 08/05/2018     Lab Results   Component Value Date    WBC 2 45 (L) 08/05/2018    HGB 7 6 (L) 08/05/2018    HCT 25 5 (L) 08/05/2018    MCV 97 08/05/2018     08/05/2018     Lab Results   Component Value Date    ALT 20 08/01/2018    AST 33 08/01/2018    ALKPHOS 78 08/01/2018    BILITOT 0 40 08/01/2018     No components found for: AMYLJKJJJASE  No results found for: LIPASE  Lab Results   Component Value Date    IRON 23 (L) 08/04/2018    TIBC 193 (L) 08/04/2018     Lab Results   Component Value Date    INR 1 57 (H) 08/02/2018         Asia Barrera MD

## 2018-08-05 NOTE — ASSESSMENT & PLAN NOTE
Patient is still quite sleepy and on physical examination he does not appear to move the right arm  Will get CT of the head knee showed and had CVA      I spoke with patient's son Roxane Sanches on the phone and made him aware of patient's situation and the plan of care

## 2018-08-06 LAB
BACTERIA BLD CULT: NORMAL
BACTERIA BLD CULT: NORMAL

## 2018-08-06 PROCEDURE — 99232 SBSQ HOSP IP/OBS MODERATE 35: CPT | Performed by: HOSPITALIST

## 2018-08-06 RX ORDER — SODIUM CHLORIDE 9 MG/ML
75 INJECTION, SOLUTION INTRAVENOUS CONTINUOUS
Status: DISCONTINUED | OUTPATIENT
Start: 2018-08-06 | End: 2018-08-07 | Stop reason: HOSPADM

## 2018-08-06 RX ADMIN — METOPROLOL TARTRATE 25 MG: 25 TABLET ORAL at 08:09

## 2018-08-06 RX ADMIN — CEFAZOLIN SODIUM 1000 MG: 1 SOLUTION INTRAVENOUS at 00:02

## 2018-08-06 RX ADMIN — SODIUM CHLORIDE 75 ML/HR: 0.9 INJECTION, SOLUTION INTRAVENOUS at 21:35

## 2018-08-06 RX ADMIN — SODIUM CHLORIDE 75 ML/HR: 0.9 INJECTION, SOLUTION INTRAVENOUS at 08:10

## 2018-08-06 RX ADMIN — METOPROLOL TARTRATE 25 MG: 25 TABLET ORAL at 21:35

## 2018-08-06 RX ADMIN — CEFAZOLIN SODIUM 1000 MG: 1 SOLUTION INTRAVENOUS at 12:30

## 2018-08-06 NOTE — PROGRESS NOTES
Theresa Farris  [id number]    84 y o   male      ASSESSMENT  · Anemia - multifactorial   No evidence of acute GI bleeding  Hemoglobin 7 6 g on 08/05        PLAN  1  Follow H&H  2  Continue PPI  3  Diet as tolerated  4  Endoscopic interventions not planned  Spoke to patient's son at bedside today who prefers that patient not have a PEG tube as he is eating a small amount of diet    Chief Complaint   Patient presents with    Fever - 75 years or older     To ED via EMS for evaluation of fever x 2 days per staff at [place] [address]  Patient noted to have increased confusion  SUBJECTIVE/HPI   Lethargic  Arousable to tactile stimuli  No melena or bright red blood per rectum  Lethargic and only tolerating small amounts of oral intake      /72   Pulse 86   Temp (!) 97 °F (36 1 °C)   Resp 18   Wt 59 9 kg (132 lb 0 9 oz)   SpO2 95%       PHYSICALEXAM  Constitutional:  Frail, elderly and lethargic  HENT:  Atraumatic  Respiratory:  No respiratory distress  Cardiovascular:  Normal rate  GI:  Soft, nondistended, normal bowel sounds, nontender  Musculoskeletal:  No edema  Neurologic:  Lethargic and arousable to tactile stimulation      Lab Results   Component Value Date    GLUCOSE 146 (H) 08/05/2018    CALCIUM 8 2 (L) 08/05/2018     08/05/2018    K 4 8 08/05/2018    CO2 24 08/05/2018     (H) 08/05/2018    BUN 32 (H) 08/05/2018    CREATININE 1 41 (H) 08/05/2018     Lab Results   Component Value Date    WBC 2 45 (L) 08/05/2018    HGB 7 6 (L) 08/05/2018    HCT 25 5 (L) 08/05/2018    MCV 97 08/05/2018     08/05/2018     Lab Results   Component Value Date    ALT 20 08/01/2018    AST 33 08/01/2018    ALKPHOS 78 08/01/2018    BILITOT 0 40 08/01/2018     No results found for: AMYLASE  No results found for: LIPASE  Lab Results   Component Value Date    IRON 23 (L) 08/04/2018    TIBC 193 (L) 08/04/2018     Lab Results   Component Value Date    INR 1 57 (H) 08/02/2018       Counseling / Coordination of Care  Total floor / unit time spent today 25 minutes  Greater than 50% of total time was spent with the patient and / or family counseling and / or coordination of care  A description of the counseling / coordination of care: 15    Yaquelin Boss

## 2018-08-06 NOTE — ASSESSMENT & PLAN NOTE
-Patient's creatinine decreased to 1 41 after hydration and blood transfusion   -baseline creatinine unknown  -patient is not eating and will institute gentle IV fluids for now while waiting decision on hospice

## 2018-08-06 NOTE — SOCIAL WORK
Continue to follow  Met with Pt's son(Primitivo) at bedside re: hospice  Pt's son agreeable for hospice eval  Pt's son inquiring about hospice house if Pt meets criteria or if Pt would go back to [facility] on hospice  Pt's son informed based on hospice evaluation where Pt will be discharged too  Pt's son in agreement for [facility]  Referral sent to [facility] via [address]  Spoke with Emma Aguero at [facility] [place], will jenniffer Pt today  Will follow

## 2018-08-06 NOTE — ASSESSMENT & PLAN NOTE
-Patient's UTI is associated with chronic indwelling Massey     -Continue IV cefazolin pending hospice evaluation

## 2018-08-06 NOTE — ASSESSMENT & PLAN NOTE
-Patient met criteria for severe sepsis on admission as evidenced by temperature 101 2°, heart of 98, respiratory of 30, leukocyte count 2 19 and lactic acid level 3 5   -This was due to E coli UTI associated with indwelling Massey catheter   -Continue IV cefazolin 1 g b i d  For now pending hospice evaluation

## 2018-08-06 NOTE — PLAN OF CARE

## 2018-08-06 NOTE — ASSESSMENT & PLAN NOTE
-CT brain without CVA    -hospice evaluation today  -patient's son updated at bedside about hospice evaluation

## 2018-08-06 NOTE — PROGRESS NOTES
Progress Note - Gerald Martisn 6/11/1924, 80 y o  male MRN: [id number]    Unit/Bed#: -02 Encounter: [id number]    Primary Care Provider: Jackie Ding MD   Date and time admitted to hospital: 8/1/2018 11:25 AM        Benign prostatic hyperplasia with urinary retention   Assessment & Plan    Will continue chronic Massey catheter        Severe sepsis [facility])   Assessment & Plan    -Patient met criteria for severe sepsis on admission as evidenced by temperature 101 2°, heart of 98, respiratory of 30, leukocyte count 2 19 and lactic acid level 3 5   -This was due to E coli UTI associated with indwelling Massey catheter   -Continue IV cefazolin 1 g b i d  For now pending hospice evaluation  Acute kidney injury (nontraumatic) ([address] )   Assessment & Plan    -Patient's creatinine decreased to 1 41 after hydration and blood transfusion   -baseline creatinine unknown  -patient is not eating and will institute gentle IV fluids for now while waiting decision on hospice  GERD (gastroesophageal reflux disease)   Assessment & Plan    -continue Protonix        Acute blood loss anemia   Assessment & Plan    -Hemoglobin is stable at 7 6 after packed red blood cell transfusion        Late onset Alzheimer's disease without behavioral disturbance   Assessment & Plan    -CT brain without CVA  -hospice evaluation today  -patient's son updated at bedside about hospice evaluation          Hypertension   Assessment & Plan    -cont BB        UTI (urinary tract infection)   Assessment & Plan    -Patient's UTI is associated with chronic indwelling Massey     -Continue IV cefazolin pending hospice evaluation        * GI bleed   Assessment & Plan    -Continue Protonix   -No GI procedure is planned           VTE Pharmacologic Prophylaxis:   Pharmacologic:  Contraindicated due to anemia  Mechanical VTE Prophylaxis in Place: Yes    Patient Centered Rounds: I have performed bedside rounds with nursing staff today      Education and Discussions with Family / Patient:  Patient's brother at bedside    Time Spent for Care: 30 minutes  More than 50% of total time spent on counseling and coordination of care as described above  Current Length of Stay: 5 day(s)    Current Patient Status: Inpatient   Certification Statement: The patient will continue to require additional inpatient hospital stay due to UTI    Discharge Plan:  Pending hospice eval    Code Status: Level 3 - DNAR and DNI      Subjective:   Patient lethargic, opens eyes to sternal rub and mumbles slightly  Objective:     Vitals:   Temp (24hrs), Av 3 °F (36 3 °C), Min:96 5 °F (35 8 °C), Max:97 8 °F (36 6 °C)    HR:  [62-86] 86  Resp:  [16-20] 18  BP: (124-166)/(60-77) 166/72  SpO2:  [94 %-99 %] 95 %  There is no height or weight on file to calculate BMI  Input and Output Summary (last 24 hours):        Intake/Output Summary (Last 24 hours) at 18 0745  Last data filed at 18 0701   Gross per 24 hour   Intake              150 ml   Output             1050 ml   Net             -900 ml       Physical Exam:     Physical Exam  Gen: NAD, lethargic but opens eyes and mumbles to sternal rub  Eyes: EOMI, PERRLA, no scleral icterus  ENMT:  Oropharynx clear of erythema or exudates, no nasal discharge, no otic discharge, dry mucous membranes  Neck:  Supple  Lymph:  No anterior or posterior cervical or supraclavicular lymphadenopathy  Cardiovascular:  Regular rate and rhythm, normal D3-L8, 1/6 systolic murmur  Lungs:  Clear to auscultation bilaterally anteriorly, no wheezes, or rales, or rhonchi  Abdomen:  Positive bowel sounds, soft, nontender, nondistended, no palpable organomegaly   Skin:  Intact, no obvious lesions or rashes, no edema  Neuro: Cranial nerves 2-12 are intact, lethargic and cannot cooperate in the neurological examination      Additional Data:     Labs:      Results from last 7 days  Lab Units 18  0519 18  0607   WBC Thousand/uL 2 45* 2 51* HEMOGLOBIN g/dL 7 6* 7 6*   HEMATOCRIT % 25 5* 24 5*   PLATELETS Thousands/uL 194 212   LYMPHO PCT %  --  85*   MONO PCT MAN %  --  5   EOSINO PCT MANUAL %  --  0       Results from last 7 days  Lab Units 08/05/18  0519  08/01/18  1133   SODIUM mmol/L 139  < > 132*   POTASSIUM mmol/L 4 8  < > 4 9   CHLORIDE mmol/L 109*  < > 99*   CO2 mmol/L 24  < > 23   BUN mg/dL 32*  < > 35*   CREATININE mg/dL 1 41*  < > 2 08*   CALCIUM mg/dL 8 2*  < > 8 0*   TOTAL PROTEIN g/dL  --   --  7 5   BILIRUBIN TOTAL mg/dL  --   --  0 40   ALK PHOS U/L  --   --  78   ALT U/L  --   --  20   AST U/L  --   --  33   GLUCOSE RANDOM mg/dL 146*  < > 149*   < > = values in this interval not displayed  Results from last 7 days  Lab Units 08/02/18  0430   INR  1 57*       Results from last 7 days  Lab Units 08/01/18  2058   POC GLUCOSE mg/dl 158*             * I Have Reviewed All Lab Data Listed Above  * Additional Pertinent Lab Tests Reviewed: [address] Admission Reviewed    Recent Cultures (last 7 days):       Results from last 7 days  Lab Units 08/01/18  1245 08/01/18  1133   BLOOD CULTURE   --  No Growth After 4 Days  No Growth After 4 Days  URINE CULTURE  >100,000 cfu/ml Escherichia coli*  --        Last 24 Hours Medication List:     Current Facility-Administered Medications:  cefazolin 1,000 mg Intravenous Q12H Kimberly Jovel MD Last Rate: 1,000 mg (08/06/18 0002)   HYDROmorphone 0 5 mg Intravenous Q4H PRN Kimberly Jovel MD    metoprolol tartrate 25 mg Oral Q12H [facility] Kathryn Ivey MD    pantoprazole 40 mg Oral BID AC Kathryn Ivey MD    phenol 1 spray Mouth/Throat Q2H PRN ALPESH Black    sodium chloride 75 mL/hr Intravenous Continuous Olivia Goss MD         Today, Patient Was Seen By: Olivia Goss MD    ** Please Note: Dictation voice to text software may have been used in the creation of this document   **

## 2018-08-07 VITALS
WEIGHT: 130.29 LBS | HEART RATE: 81 BPM | OXYGEN SATURATION: 93 % | RESPIRATION RATE: 18 BRPM | DIASTOLIC BLOOD PRESSURE: 66 MMHG | TEMPERATURE: 97.5 F | SYSTOLIC BLOOD PRESSURE: 141 MMHG

## 2018-08-07 PROCEDURE — 99238 HOSP IP/OBS DSCHRG MGMT 30/<: CPT | Performed by: HOSPITALIST

## 2018-08-07 RX ADMIN — PANTOPRAZOLE SODIUM 40 MG: 40 TABLET, DELAYED RELEASE ORAL at 06:38

## 2018-08-07 RX ADMIN — CEFAZOLIN SODIUM 1000 MG: 1 SOLUTION INTRAVENOUS at 00:33

## 2018-08-07 RX ADMIN — METOPROLOL TARTRATE 25 MG: 25 TABLET ORAL at 08:59

## 2018-08-07 RX ADMIN — CEFAZOLIN SODIUM 1000 MG: 1 SOLUTION INTRAVENOUS at 12:38

## 2018-08-07 NOTE — HOSPICE NOTE
Met with patient, son Henry Ochoa and his sister-in-law at bedside to discuss hospice services  Answered all questions and son Henry Ochoa reports he is POA but that his family is all on the same page as far as care moving forward and they would be agreeable to hospice services  Reviewed case with hospice MD and patient is approved for return to SNF with hospice services  Consents signed with Henry Ochoa and will follow up for when discharge is and contact facility to arrange start of care

## 2018-08-07 NOTE — ASSESSMENT & PLAN NOTE
-Patient met criteria for severe sepsis on admission as evidenced by temperature 101 2°, heart of 98, respiratory of 30, leukocyte count 2 19 and lactic acid level 3 5   -This was due to E coli UTI associated with indwelling Massey catheter   -patient has received nearly 7 days of IV antibiotics consisting of cefepime in Ancef based on culture data is showing E coli sensitive to Ancef

## 2018-08-07 NOTE — PROGRESS NOTES
Pt received and care transferred from [facility] and [place]  Pt at times cooperative once his attention is had  Oriented to person only  Ate mashed banana and honey to take his pills  Shilpa patent and secured  Turned q2  SCDs present  Pt has nasal cannula on but at times does remove it

## 2018-08-07 NOTE — ASSESSMENT & PLAN NOTE
-Patient's UTI was associated with chronic indwelling Massey      -patient has received nearly 7 days of IV antibiotics consisting of cefepime in Ancef based on culture data is showing E coli sensitive to Ancef  -no need for further antibiotic therapy at this time  -being discharged to hospice

## 2018-08-07 NOTE — ASSESSMENT & PLAN NOTE
-being discharged to hospice  -Continue Protonix   -hemoglobin stabilized after transfusion of packed red blood cells  -No GI procedure done at the family's request

## 2018-08-07 NOTE — SOCIAL WORK
Continue to follow  Spoke with Chris Coffman with [facility],Pt will be returning to [facility] with [facility] hosp[facility]  Pt's son signed consents with Chris Coffman yesterday  Ricky Keepers in admissions at [facility] informed of discharge today with [facility]  Ambulance auth obtained from [employer], auth-case# [id number]  Spoke with Meryle Course at [facility], transportation arranged via [address] ambulance to [facility]  Pickup is 4:30pm  Left message with Pt's son(Primitivo: [phone]) re: discharge planning for today  Anticipate return call  Call made to Chris Coffman at [facility], left message for discharge time today  Anticipate return call  Faxed discharge instructions to [facility]

## 2018-08-07 NOTE — PROGRESS NOTES
Pt received and care transferred from [place], [facility] and [place]  Pt at times cooperative once his attention is had  Oriented to person only  Ate mashed banana and honey to take his pills  Shilpa patent and secured  Turned q2  SCDs present  Pt has nasal cannula on but at times does remove it

## 2018-08-07 NOTE — DISCHARGE SUMMARY
Discharge- Alyssa Mars 6/11/1924, 80 y o  male MRN: [id number]    Unit/Bed#: -02 Encounter: [id number]    Primary Care Provider: Olu Tubbs MD   Date and time admitted to hospital: 8/1/2018 11:25 AM        Benign prostatic hyperplasia with urinary retention   Assessment & Plan    -Massey catheter will remain in place when patient is discharged to hospice        Severe sepsis [facility])   Assessment & Plan    -Patient met criteria for severe sepsis on admission as evidenced by temperature 101 2°, heart of 98, respiratory of 30, leukocyte count 2 19 and lactic acid level 3 5   -This was due to E coli UTI associated with indwelling Massey catheter   -patient has received nearly 7 days of IV antibiotics consisting of cefepime in Ancef based on culture data is showing E coli sensitive to Ancef        Acute kidney injury (nontraumatic) ([facility] [address] )   Assessment & Plan    -Patient's creatinine decreased to 1 41 after hydration and blood transfusion   -baseline creatinine unknown  -patient being discharged to hospice        GERD (gastroesophageal reflux disease)   Assessment & Plan    -continue Protonix        Acute blood loss anemia   Assessment & Plan    -Hemoglobin is stable in 7s after packed red blood cell transfusion        Late onset Alzheimer's disease without behavioral disturbance   Assessment & Plan    -CT brain without CVA  -patient being discharged to hospice          Hypertension   Assessment & Plan    -cont BB        UTI (urinary tract infection)   Assessment & Plan    -Patient's UTI was associated with chronic indwelling Massey      -patient has received nearly 7 days of IV antibiotics consisting of cefepime in Ancef based on culture data is showing E coli sensitive to Ancef  -no need for further antibiotic therapy at this time  -being discharged to hospice        * GI bleed   Assessment & Plan    -being discharged to hospice  -Continue Protonix   -hemoglobin stabilized after transfusion of packed red blood cells  -No GI procedure done at the family's request          Discharging Physician / Practitioner: Alfonso Zafar MD  PCP: Fran Estrella MD  Admission Date:   Admission Orders     Ordered        08/01/18 1337  Inpatient Admission (expected length of stay for this patient is greater than two midnights)  Once             Discharge Date: 08/07/18    Resolved Problems  Date Reviewed: 8/6/2018    None          Consultations During Hospital Stay:  · Gastroenterology    Procedures Performed:     · None    Significant Findings / Test Results:     · CT brain: No acute intracranial abnormality   Microangiopathic changes  · CT A/P: No acute intra-abdominal abnormality is identified  Moderate bilateral hydroureteronephrosis  Splenomegaly  · Chest x-ray: Mild interstitial prominence suggests interstitial edema  Incidental Findings:   · None     Test Results Pending at Discharge (will require follow up): · None     Outpatient Tests Requested:  · None    Complications:  None    Reason for Admission:  GI bleed    Hospital Course:     Kira Zimmerman is a 80 y o  male patient who originally presented to the hospital on 8/1/2018 due to GI bleed as well as fever as outlined in the H&P done on admission  Please see above list of diagnoses and related plan for additional information  Condition at Discharge: fair     Discharge Day Visit / Exam:     Subjective:  Patient says I am okay    Vitals: Blood Pressure: 141/66 (08/07/18 0621)  Pulse: 81 (08/07/18 0621)  Temperature: 97 5 °F (36 4 °C) (08/07/18 0621)  Temp Source: Axillary (08/06/18 2259)  Respirations: 18 (08/07/18 0621)  Weight - Scale: 59 1 kg (130 lb 4 7 oz) (08/07/18 0621)  SpO2: 93 % (08/07/18 0235)  Exam:   Physical Exam  Gen: NAD, lethargic but opens eyes to sternal rub  Eyes: EOMI, PERRLA, no scleral icterus  ENMT:  Oropharynx clear of erythema or exudates, no nasal discharge, no otic discharge, dry mucous membranes  Neck:  Supple  Lymph:  No anterior or posterior cervical or supraclavicular lymphadenopathy  Cardiovascular:  Regular rate and rhythm, normal D3-W4, 1/6 systolic murmur  Lungs:  Clear to auscultation bilaterally anteriorly, no wheezes, or rales, or rhonchi  Abdomen:  Positive bowel sounds, soft, nontender, nondistended, no palpable organomegaly   Skin:  Intact, no obvious lesions or rashes, no edema  Neuro: Cranial nerves 2-12 are intact, lethargic and cannot cooperate in the neurological examination    Discharge instructions/Information to patient and family:   See after visit summary for information provided to patient and family  Provisions for Follow-Up Care:  See after visit summary for information related to follow-up care and any pertinent home health orders  Disposition:     Other: Hospice    For Discharges to [facility]:   · Not Applicable to this Patient - Not Applicable to this Patient    Planned Readmission: None     Discharge Statement:  I spent 30 minutes discharging the patient  This time was spent on the day of discharge  I had direct contact with the patient on the day of discharge  Greater than 50% of the total time was spent examining patient, answering all patient questions, arranging and discussing plan of care with patient as well as directly providing post-discharge instructions  Additional time then spent on discharge activities  Discharge Medications:  See after visit summary for reconciled discharge medications provided to patient and family        ** Please Note: This note has been constructed using a voice recognition system **

## 2018-08-07 NOTE — NURSING NOTE
[facility] arrived for patient transport to [facility]  To return hospice care  Resting comfortably

## 2018-08-07 NOTE — ASSESSMENT & PLAN NOTE
-Patient's creatinine decreased to 1 41 after hydration and blood transfusion   -baseline creatinine unknown  -patient being discharged to hospice